# Patient Record
Sex: FEMALE | Race: WHITE | NOT HISPANIC OR LATINO | Employment: FULL TIME | URBAN - METROPOLITAN AREA
[De-identification: names, ages, dates, MRNs, and addresses within clinical notes are randomized per-mention and may not be internally consistent; named-entity substitution may affect disease eponyms.]

---

## 2018-07-13 ENCOUNTER — TRANSCRIBE ORDERS (OUTPATIENT)
Dept: ADMINISTRATIVE | Facility: HOSPITAL | Age: 68
End: 2018-07-13

## 2018-07-13 DIAGNOSIS — Z12.39 SCREENING BREAST EXAMINATION: Primary | ICD-10-CM

## 2018-07-25 ENCOUNTER — HOSPITAL ENCOUNTER (OUTPATIENT)
Dept: RADIOLOGY | Facility: HOSPITAL | Age: 68
Discharge: HOME | End: 2018-07-25
Payer: COMMERCIAL

## 2018-07-25 DIAGNOSIS — Z12.39 SCREENING BREAST EXAMINATION: ICD-10-CM

## 2018-07-25 PROCEDURE — 77067 SCR MAMMO BI INCL CAD: CPT

## 2023-12-29 ENCOUNTER — TELEPHONE (OUTPATIENT)
Dept: NEUROLOGY | Facility: CLINIC | Age: 73
End: 2023-12-29

## 2023-12-29 NOTE — TELEPHONE ENCOUNTER
VM received at 11 am:    I'm calling on behalf of my wife. She has some memory loss problems and I need to possibly have a few questions answered and make an appointment to see a neurologist. I need to see what other medications we can get for dementia and the beginning stages of Alzheimer's. My phone number is  [phone]. Thank you.  _____________________________________________________________________________    Called the phone number to obtain the name and  of the potential patient. Spoke with Simone. He provided this pt's name and . Routed to the clerical team to follow up regarding scheduling an OV.   CB# [phone].

## 2024-04-17 ENCOUNTER — TELEPHONE (OUTPATIENT)
Dept: NEUROLOGY | Facility: CLINIC | Age: 74
End: 2024-04-17

## 2024-04-19 ENCOUNTER — CONSULT (OUTPATIENT)
Dept: NEUROLOGY | Facility: CLINIC | Age: 74
End: 2024-04-19
Payer: MEDICARE

## 2024-04-19 VITALS — SYSTOLIC BLOOD PRESSURE: 116 MMHG | HEART RATE: 82 BPM | DIASTOLIC BLOOD PRESSURE: 80 MMHG

## 2024-04-19 DIAGNOSIS — G30.9 ALZHEIMER'S DEMENTIA WITHOUT BEHAVIORAL DISTURBANCE (HCC): Primary | ICD-10-CM

## 2024-04-19 DIAGNOSIS — F02.80 ALZHEIMER'S DEMENTIA WITHOUT BEHAVIORAL DISTURBANCE (HCC): Primary | ICD-10-CM

## 2024-04-19 PROCEDURE — 99205 OFFICE O/P NEW HI 60 MIN: CPT | Performed by: STUDENT IN AN ORGANIZED HEALTH CARE EDUCATION/TRAINING PROGRAM

## 2024-04-19 RX ORDER — BRIMONIDINE TARTRATE 2 MG/ML
SOLUTION/ DROPS OPHTHALMIC
COMMUNITY
Start: 2024-02-01

## 2024-04-19 RX ORDER — METOPROLOL SUCCINATE 25 MG/1
25 TABLET, EXTENDED RELEASE ORAL DAILY
COMMUNITY
Start: 2024-02-20

## 2024-04-19 RX ORDER — SIMVASTATIN 10 MG
1 TABLET ORAL EVERY EVENING
COMMUNITY
Start: 2023-08-01 | End: 2024-07-31

## 2024-04-19 RX ORDER — DONEPEZIL HYDROCHLORIDE 5 MG/1
TABLET, FILM COATED ORAL
COMMUNITY
Start: 2024-01-25

## 2024-04-19 RX ORDER — BIMATOPROST 0.1 MG/ML
SOLUTION/ DROPS OPHTHALMIC
COMMUNITY
Start: 2024-02-16

## 2024-04-19 RX ORDER — MIRTAZAPINE 7.5 MG/1
7.5 TABLET, FILM COATED ORAL
COMMUNITY
Start: 2024-03-18

## 2024-04-19 NOTE — PROGRESS NOTES
Patient ID: Arely Wood is a 73 y.o. female.    Assessment/Plan:    Diagnoses and all orders for this visit:    Alzheimer's dementia without behavioral disturbance (HCC)    Patient is a 73 year old female presenting to the clinic today in regards to a second opinion for her dementia. Patient has a PMH of palpitations, AD, anxiety, insomnia, and hypercholesteremia.     Given patient's history of being reviewed from [facility] notes along with patient and her  confirmed that, there seems to be a mismatch in regards to patient's progression for dementia in just 3 years versus possibly other underlying etiology.  There has been a gradual decline but patient is still able to maintain all of her ADLs on her own.  Seems to be that patient is much better at home but on testing she appears to be worse.  Patient's  is concerned that this is possibly not dementia.  At this time, since we are unable to see the MRI images, we will repeat an MRI brain with and without contrast with NeuroQuant analysis.  Along with that, we will also order neuropsych testing to identify exactly where patient's memory deficits are arising from.  This will also help us compare against the testing that was done in 2021.      To aid patient's memory, we will place an OT referral for cognitive exercises.    Given that patient and her  were very upset in regards to the license being taken away from the patient, we will order a OT driving assessment for the patient.    MRI Brain wo contrast 1/26/23: No change. No mass. Moderate to marked cerebral white matter disease. That appearance is likely due to chronic small vessel ischemic disease. (Unable to see pictures)      Plan:  Continue Aricept 5 mg since the patient never stopped it  Continue Remeron 7.5 mg for sleep  MRI Brain Neuroquant w wo contrast  Neuropsych testing ordered  OT referral for cognitive therapies and driving assessment  Monitor for worsening symptoms  Call  for any questions or concerns      The patient indicates understanding of these issues and agrees with the plan.    Return in about 6 months (around 10/19/2024).    This patient case was discussed with Dr. Zully Rutledge.      Subjective:    HPI    Patient is a 73 year old female presenting to the clinic today in regards to a second opinion for her dementia. Patient has a PMH of palpitations, AD, anxiety, insomnia, and hypercholesteremia.     Patient has been seeing [facility], Dr. Emilie Briggs, since 2/4/21 for memory changes. At that time, the patient's MMSE score was a 30/30. Given that, the patient was sent for neuropsychological testing. Patient wanted to defer the MRI so it was deferred. Neuropsych testing was done on 5/3/21 and patient was deemed as mild cognitive impairment with her performance variable across different measures. On 3/2/22, the patient had a slight decline on her exam in comparison to the initial exam and as a result, the patient was started on Aricept 5 mg. Concomitantly, she had to stop the Celexa which she was taking. On 11/29/22 at the office visit, the patient was tachycardiac, anxious, memory was worse, couldn't complete sentences, and was having difficulties finding words. So, they decided to stop the Aricept and resume the Celexa. On 1/31/223, the patient called to state that she never actually started the Celexa after stopping the Aricept and asked if she can continue the Aricept once again. When the patient was seen on 3/8/23, the patient was back on Aricept but struggling with sleep despite using melatonin. On 10/12/23, the patient was started on trazodone given the side effects with Mirtazepine. She was also offered an evaluation at [facility] for Lecanumab despite the history of 2 microhemorrhages. On 12/27/23, the patient saw [facility] medicine who determined that she was not a candidate for Lecanumab given the 7/30 on SLUMS and her Aricept was stopped  due the side effects. That day she was switched back to Mirtazapine for mood.    Now she presents to [facility] for a second opinion. They are very upset that at the Geriatric medicine visit, the doctor took away the patient's license. According to the patient and her , the patient is still able to maintain all of her ADLs on her own. She started having difficulties with numbers approximately 6 months and that is when her  took over the finances. Patient's  states that he really does not believe that she has dementia given its just her memory that is affected.     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    Patient denies depression, sleep disturbances,or agitation. Of note, the patient still continues to take her Aricept.    The following portions of the patient's history were reviewed and updated as appropriate: She  has no past medical history on file.     Patient Active Problem List    Diagnosis Date Noted    Alzheimer's dementia without behavioral disturbance (HCC) 12/27/2023     She  has no past surgical history on file.  Her Family history is unknown by patient.  She  reports that she does not drink alcohol and does not use drugs. No history on file for tobacco use.  Current Outpatient Medications   Medication Sig Dispense Refill    brimonidine tartrate 0.2 % ophthalmic solution INSTILL 1 DROP TWICE A DAY IN THE LEFT EYE      donepezil (ARICEPT) 5 mg tablet       Lumigan 0.01 % ophthalmic drops INSTILL 1 DROP INTO LEFT EYE AT BEDTIME      metoprolol succinate (TOPROL-XL) 25 mg 24 hr tablet Take 25 mg by mouth daily      mirtazapine (REMERON) 7.5 MG tablet Take 7.5 mg by mouth      simvastatin (ZOCOR) 10 mg tablet Take 1 tablet by mouth every evening       No current facility-administered medications for this visit.     Current Outpatient Medications on File Prior to  Visit   Medication Sig    brimonidine tartrate 0.2 % ophthalmic solution INSTILL 1 DROP TWICE A DAY IN THE LEFT EYE    donepezil (ARICEPT) 5 mg tablet     Lumigan 0.01 % ophthalmic drops INSTILL 1 DROP INTO LEFT EYE AT BEDTIME    metoprolol succinate (TOPROL-XL) 25 mg 24 hr tablet Take 25 mg by mouth daily    mirtazapine (REMERON) 7.5 MG tablet Take 7.5 mg by mouth    simvastatin (ZOCOR) 10 mg tablet Take 1 tablet by mouth every evening     No current facility-administered medications on file prior to visit.     She has No Known Allergies..         Objective:    Blood pressure 116/80, pulse 82.    Physical Exam  Vitals reviewed.   HENT:      Head: Normocephalic and atraumatic.      Mouth/Throat:      Mouth: Mucous membranes are moist.   Eyes:      General: Lids are normal.      Extraocular Movements: Extraocular movements intact.      Pupils: Pupils are equal, round, and reactive to light.   Cardiovascular:      Rate and Rhythm: Normal rate.   Pulmonary:      Effort: Pulmonary effort is normal.   Musculoskeletal:         General: Normal range of motion.   Neurological:      Mental Status: She is alert.      Deep Tendon Reflexes:      Reflex Scores:       Tricep reflexes are 1+ on the right side and 1+ on the left side.       Bicep reflexes are 1+ on the right side and 1+ on the left side.       Brachioradialis reflexes are 1+ on the right side and 1+ on the left side.       Patellar reflexes are 1+ on the right side and 1+ on the left side.       Achilles reflexes are 1+ on the right side and 1+ on the left side.  Psychiatric:         Mood and Affect: Mood normal.         Speech: Speech normal.         Neurological Exam  Mental Status  Alert. Oriented only to person. Immediate recall: 0/5. At 5 minutes 0/5. Unable to copy figure. Able to contour and put numbers. Not able to accurately draw hands.. Speech is normal. Language is fluent with no aphasia. Unable to perform serial calculations. and 3 backward.  MOCA 7/30.  Gets confused quickly.    Cranial Nerves  CN II: Visual acuity is normal. Visual fields full to confrontation.  CN III, IV, VI: Extraocular movements intact bilaterally. Normal lids and orbits bilaterally. Pupils equal round and reactive to light bilaterally.  CN V: Facial sensation is normal.  CN VII: Full and symmetric facial movement.  CN VIII: Hearing is normal.  CN IX, X: Palate elevates symmetrically. Normal gag reflex.  CN XI: Shoulder shrug strength is normal.  CN XII: Tongue midline without atrophy or fasciculations.    Motor  Normal muscle bulk throughout. No fasciculations present. Normal muscle tone. No abnormal involuntary movements.  Globally 4+/5 strength.    Sensory  Light touch is normal in upper and lower extremities.     Reflexes                                            Right                      Left  Brachioradialis                    1+                         1+  Biceps                                 1+                         1+  Triceps                                1+                         1+  Patellar                                1+                         1+  Achilles                                1+                         1+    Coordination  Right: Finger-to-nose normal.Left: Finger-to-nose normal.    Gait  Casual gait: Slowed gait.        ROS:  Review of Systems   Constitutional:  Negative for appetite change, fatigue and fever.   HENT: Negative.  Negative for hearing loss, tinnitus, trouble swallowing and voice change.    Eyes: Negative.  Negative for photophobia, pain and visual disturbance.   Respiratory: Negative.  Negative for shortness of breath.    Cardiovascular: Negative.  Negative for palpitations.   Gastrointestinal: Negative.  Negative for nausea and vomiting.   Endocrine: Negative.  Negative for cold intolerance.   Genitourinary: Negative.  Negative for dysuria, frequency and urgency.   Musculoskeletal:  Negative for back pain, gait problem, myalgias, neck pain  and neck stiffness.   Skin: Negative.  Negative for rash.   Allergic/Immunologic: Negative.    Neurological: Negative.  Negative for dizziness, tremors, seizures, syncope, facial asymmetry, speech difficulty, weakness, light-headedness, numbness and headaches.   Hematological: Negative.  Does not bruise/bleed easily.   Psychiatric/Behavioral:  Positive for confusion. Negative for hallucinations and sleep disturbance.         Patient  states she is having a hard time getting a full sentence out. Patient states she finds herself confused things don't click with her. Looking at a clock is confusing. Forgetting names and places have been difficult. The last two years there has been a decline.

## 2024-04-19 NOTE — PROGRESS NOTES
Patient ID: Arely Wood is a 73 y.o. female.    Assessment/Plan:    No problem-specific Assessment & Plan notes found for this encounter.       {Assess/PlanSmartLinks:95457}       Subjective:    HPI    {[facility] HPI texts:15991}    {Common ambulatory SmartLinks:96323}         Objective:    There were no vitals taken for this visit.    Physical Exam    Neurological Exam      ROS:    Review of Systems   Constitutional:  Negative for appetite change, fatigue and fever.   HENT: Negative.  Negative for hearing loss, tinnitus, trouble swallowing and voice change.    Eyes: Negative.  Negative for photophobia, pain and visual disturbance.   Respiratory: Negative.  Negative for shortness of breath.    Cardiovascular: Negative.  Negative for palpitations.   Gastrointestinal: Negative.  Negative for nausea and vomiting.   Endocrine: Negative.  Negative for cold intolerance.   Genitourinary: Negative.  Negative for dysuria, frequency and urgency.   Musculoskeletal:  Negative for back pain, gait problem, myalgias, neck pain and neck stiffness.   Skin: Negative.  Negative for rash.   Allergic/Immunologic: Negative.    Neurological: Negative.  Negative for dizziness, tremors, seizures, syncope, facial asymmetry, speech difficulty, weakness, light-headedness, numbness and headaches.   Hematological: Negative.  Does not bruise/bleed easily.   Psychiatric/Behavioral:  Positive for confusion. Negative for hallucinations and sleep disturbance.         Patient  states she is having a hard time  getting a full sentence out. Patient states she finds herself confused things don't click with her. Looking at a clock is confusing. Forgetting names and places have been difficult. The last two years there has been a decline.

## 2024-05-09 ENCOUNTER — EVALUATION (OUTPATIENT)
Facility: CLINIC | Age: 74
End: 2024-05-09
Payer: MEDICARE

## 2024-05-09 DIAGNOSIS — F02.80 ALZHEIMER'S DEMENTIA WITHOUT BEHAVIORAL DISTURBANCE (HCC): Primary | ICD-10-CM

## 2024-05-09 DIAGNOSIS — G30.9 ALZHEIMER'S DEMENTIA WITHOUT BEHAVIORAL DISTURBANCE (HCC): Primary | ICD-10-CM

## 2024-05-09 PROCEDURE — 97167 OT EVAL HIGH COMPLEX 60 MIN: CPT

## 2024-05-09 NOTE — PROGRESS NOTES
OCCUPATIONAL THERAPY INITIAL EVALUATION        24  Arely Wood  1950  [id number]  Divine Polk MD   Diagnosis ICD-10-CM Associated Orders   1. Alzheimer's dementia without behavioral disturbance (HCC)  G30.9 Ambulatory Referral to Occupational Therapy    F02.80             Assessment/Plan      SKILLED ANALYSIS:    Pt is a 73 y.o. female referred to Occupational Therapy s/p Alzheimer's dementia without behavioral disturbance (HCC) [G30.9, F02.80].  Pt participated in skilled OT evaluation and, following formalized testing as well as clinical observation, pt presents with the following areas of deficit: STM, LTM/delayed recall, processing speed, direction following, multitasking/dual tasking, attention, divided attention/alternating attention, and mental manipulation impacting ability to independently and safely complete ADL/IADL and leisure tasks.  Pt does demo the need for skilled Occupational Therapy services 2x/week for 12 weeks with focus on ADL re-training, IADL re-training, fxnl cognition, short term memory, long term memory, fxnl attention, family training/education, and leisure pursuits to address the goals as listed below. Pt in agreement with POC, POC to  24          Short Term Goals (to be achieved within 4 weeks):  Pt will maintain attention to task for 45 minutes in multimodal environment for improved memory/ immediate recall for increased engagement in IADLs and leisure activities    Pt will improve auditory and visual processing to follow 1 step directions with 60% accuracy for increased engagement in IADLs and leisure activities   Pt will demo G recall of 50% of Written and verbal information utilizing memory strategy of choice for improved STM/delayed memory and improved ability to engage in ADLs/IADLs/ work and leisure activities.  Pt will identify 3-5 leisure activities that she enjoys, which incorporates cognitive and physical aspects to increase overall health and  "independence with ADLs/IADLs and leisure activities     Long Term Goals (to be achieved within 12 weeks):    Pt will improve auditory and visual processing speed to follow 2 step directions with processing time of <1min and 80% accuracy for improved IADL performance and engagement in leisure activities.   Pt will demo G carryover of use of internal/external memory strategy aides for improved recall of daily events, improved executive functioning with 70% accuracy   Pt will consistently engage in weekly leisure activities to allow for optimal cognitive functioning.   Pt will be S with HEP, with assistance from                  SUBJECTIVE      SUBJECTIVE: \"Sometimes I forget things\"    PATIENT GOAL: to improve memory and attention         HISTORY OF PRESENT ILLNESS:     Pt is a 73 y.o. female who was referred to Occupational Therapy s/p  Alzheimer's dementia without behavioral disturbance (HCC) [G30.9, F02.80]. Pt under the care of neurology since 2021 for declining memory, recently sought a second opinion after receiving Alzheimer's dementia diagnosis. Pt referred to OT to maximize cognitive functioning. Pt has also been referred for a  evaluation.           PMH: palpitations, AD, anxiety, insomnia, and hypercholesteremia.     Past Surgical Hx: History reviewed. No pertinent surgical history.     HOME SETUP/ CLOF: lives with , 2 SH, 2 DIANE, power room on 1st floor; bed/ full bath on 2nd floor; laundry machines on 2nd floor     ADLs: I with all ADLs    IADLs: pt reports doing laundry and cooking; pt's  manages finances; pt manages own medications; uses a pill box   (-) ; stopped driving about 2 months ago; doctor reported to [place], with pt reporting she did surrender her license but is upset and wants to be able to return to driving. A  evaluation was ordered for further assessment of driving abilities and safety.     Functional Mobility: I with functional mobility; reports " "no difficulties with balance; (-) dizziness     Work: retired RN    Physical activity/ leisure engagement: walks with  2-3x/ week, gardening, \"I don't really know\"      Pain Levels: none            OBJECTIVE         PHYSICAL ASSESSMENT    R handed     UE ROM LUE AROM  RUE AROM COMMENTS   Shoulder Flex WNL WNL    Shoulder Ext WNL  WNL    Shoulder ABD WNL  WNL    Horizontal ABD WNL  WNL    Horizontal ADD WNL  WNL    Shoulder IR  WNL  WNL    Shoulder ER WNL WNL    Elbow Flex WNL WNL    Elbow Ext  WNL WNL    Supination  WNL WNL    Pronation  WNL WNL    Wrist Flex WNL WNL    Wrist Ext WNL WNL    Finger Flex WNL WNL    Finger Ext WNL WNL    Opposition  WNL WNL                               MMT  LUE RUE   Comments   Shoulder Flex/Ext 5/5 5/5    Shoulder Abd 5/5 5/5    Shoulder ER 5/5 5/5    Shoulder IR 5/5 5/5    Elbow Flex 5/5 5/5    Elbow Ext 5/5 5/5    Wrist Flex 5/5 5/5    Wrist Ext 5/5 5/5    Gross Grasp 5/5 5/5            SENSATION LUE RUE Comments   Sharp Dull  Intact Intact    Proprioception Intact Intact    Pain Intact Intact    Hot/Cold Temp Intact Intact              COGNITIVE ASSESSMENT    Formal Assessments:  Trails A: 3 min 40 sec, errors after number 15  Trails B: NT  Mesulum symbol scannin min 57 sec, missed 2 symbols     Clinical Observation:  Memory: Impaired working memory; impaired STM; impaired LTM  Attention: impaired sustained attn; distracted by internal thoughts   Processing: delayed processing; difficulty answering simple questions; impaired comprehension of 1 step directions  Executive functioning: impaired mental manipulation   Communication: difficulty with expression and completing sentences     Cognitive Checklist: Patient indicated that she is experiencing the following symptoms:    Memory: Remembering what people have told you, Remembering peoples' names, Remembering the date/day of the week, and Keeping track of your appointments    Attention: Easily distracted, Dividing your " attention (i.e., multi-tasking), and Losing your train of thought    Processing: Processing new information , Following directions, and Responding to questions in a timely manner     Executive Functions: Organizing your thoughts and Planning daily tasks    Communication: Word finding in conversation, Expressing thoughts and ideas fluently, and Expressing thoughts and ideas into writing    Visual: Losing spot on the page when reading            VISUAL ASSESSMENT        Vision Screen       ROM WFL    Tracking WFL    Saccades WFL    Convergence/ Divergence WFL              PLANNED THERAPY INTERVENTIONS:  Therapeutic activity  Therapeutic exercise  Neuromuscular re-education   Cognitive re-training   ADL re-training   IADL re-training  Internal and external memory aides  Sustained/alternating/divided attention  Memory and mental manipulation  Auditory processing with immediate recall  Memory retention with immediate and delayed recall         INTERVENTION COMMENTS:  Diagnosis: Alzheimer's dementia without behavioral disturbance (HCC) [G30.9, F02.80]  Precautions: impaired memory   Insurance: Payor: MEDICARE / Plan: MEDICARE A AND B / Product Type: Medicare A & B Fee for Service /   Visit: 1   PN due 6/13/24

## 2024-05-11 ENCOUNTER — HOSPITAL ENCOUNTER (OUTPATIENT)
Dept: MRI IMAGING | Facility: HOSPITAL | Age: 74
Discharge: HOME | End: 2024-05-11
Payer: MEDICARE

## 2024-05-11 DIAGNOSIS — G30.9 ALZHEIMER'S DEMENTIA WITHOUT BEHAVIORAL DISTURBANCE (HCC): ICD-10-CM

## 2024-05-11 DIAGNOSIS — F02.80 ALZHEIMER'S DEMENTIA WITHOUT BEHAVIORAL DISTURBANCE (HCC): ICD-10-CM

## 2024-05-11 PROCEDURE — A9585 GADOBUTROL INJECTION: HCPCS

## 2024-05-11 PROCEDURE — 70553 MRI BRAIN STEM W/O & W/DYE: CPT

## 2024-05-11 RX ORDER — GADOBUTROL 604.72 MG/ML
7.5 INJECTION INTRAVENOUS
Status: COMPLETED | OUTPATIENT
Start: 2024-05-11 | End: 2024-05-11

## 2024-05-11 RX ADMIN — GADOBUTROL 7.5 ML: 604.72 INJECTION INTRAVENOUS at 08:52

## 2024-05-16 ENCOUNTER — OFFICE VISIT (OUTPATIENT)
Facility: CLINIC | Age: 74
End: 2024-05-16
Payer: MEDICARE

## 2024-05-16 DIAGNOSIS — F02.80 ALZHEIMER'S DEMENTIA WITHOUT BEHAVIORAL DISTURBANCE (HCC): Primary | ICD-10-CM

## 2024-05-16 DIAGNOSIS — G30.9 ALZHEIMER'S DEMENTIA WITHOUT BEHAVIORAL DISTURBANCE (HCC): Primary | ICD-10-CM

## 2024-05-16 PROCEDURE — 97530 THERAPEUTIC ACTIVITIES: CPT

## 2024-05-16 NOTE — PROGRESS NOTES
"Occupational Therapy Daily Note:    Today's date: 2024  Patient name: Arely Wood  : 1950  MRN: [id number]  Referring provider: Divine Polk MD  Dx:   Encounter Diagnosis   Name Primary?    Alzheimer's dementia without behavioral disturbance (HCC) Yes       Subjective: \"it just goes out\" pt referring to difficulty with her memory    Objective: Pt engaged in skilled OT treatment session with focus on fxnl cognition, short term memory, long term memory, and fxnl attention to increase engagement, endurance, tolerance, and independence with daily ADL and IADL tasks.     CPT Code Minutes                                           Task Details        Therapeutic Activity  Functional cognition:     SLUMS: , difficulty with delayed recall, processing, orientation, mental manipulation, working memory            Began education on compensatory memory strategies, including chunking, repetition, speaking out loud, and writing information down.     Cognitive task focused on use of memory strategies. Pt provided with list of 15 words. Pt unable to formulate categories based on presented words. Once provided 3 categories, pt required mod cues to place the 15 words into correct categories. Pt with mod/max difficulty following 1 step directions; required increased time for processing.             Neuro Re-Ed               Therapeutic Exercise               Manual          HEP  : Cog HEP: Provided 4 categories. Pt to list 5 words in each category and then practice memory strategies to attempt recall          Assessment: Tolerated treatment well. Began education on compensatory memory. Pt with fair understanding, will cont to practice. Patient would benefit from continued skilled OT.    Plan: Continued skilled OT per POC    INTERVENTION COMMENTS:  Diagnosis: Alzheimer's dementia without behavioral disturbance (HCC) [G30.9, F02.80]  Precautions: impaired memory   Insurance: Payor: MEDICARE / Plan: MEDICARE A " AND B / Product Type: Medicare A & B Fee for Service /   Visit: 2  PN due 6/13/24

## 2024-05-22 ENCOUNTER — OFFICE VISIT (OUTPATIENT)
Facility: CLINIC | Age: 74
End: 2024-05-22
Payer: MEDICARE

## 2024-05-22 DIAGNOSIS — F02.80 ALZHEIMER'S DEMENTIA WITHOUT BEHAVIORAL DISTURBANCE (HCC): Primary | ICD-10-CM

## 2024-05-22 DIAGNOSIS — G30.9 ALZHEIMER'S DEMENTIA WITHOUT BEHAVIORAL DISTURBANCE (HCC): Primary | ICD-10-CM

## 2024-05-22 PROCEDURE — 97530 THERAPEUTIC ACTIVITIES: CPT

## 2024-05-22 NOTE — PROGRESS NOTES
"Occupational Therapy Daily Note:    Today's date: 2024  Patient name: Arely Wood  : 1950  MRN: [id number]  Referring provider: Divine Polk MD  Dx:   Encounter Diagnosis   Name Primary?    Alzheimer's dementia without behavioral disturbance (HCC) Yes       Subjective: \"I haven't done this in a long time\"    Objective: Pt engaged in skilled OT treatment session with focus on fxnl cognition, short term memory, long term memory, and fxnl attention to increase engagement, endurance, tolerance, and independence with daily ADL and IADL tasks.     CPT Code Minutes                                           Task Details        Therapeutic Activity  Cont with education on compensatory memory strategies, including chunking, repetition, speaking out loud, and writing information down.        Cognitive task of visual memory using a detailed picture.  -when asked to name all items in the picture, pt with max difficulty with naming/ word finding. Pt required cues and choice of 2 to correctly name common items; increased time. Repeated naming of all items 3x, with pt writing down names on separate paper on last trial.   -From list of written words, pt able to identify objects in the picture with min cueing.   -Categorization: pt required max cues to identify items in the picture to fit into the given category.   -pt required entire session to complete task.   -Homework: pt given 3 more category headings to sort items from the picture into the categories.                  Neuro Re-Ed               Therapeutic Exercise               Manual          HEP  : Cog HEP: Provided 4 categories. Pt to list 5 words in each category and then practice memory strategies to attempt recall          Assessment: Tolerated treatment well. Cont education on compensatory strategies. Pt with max difficulty using strategies, increased time to complete task. Patient would benefit from continued skilled OT.    Plan: Continued skilled " OT per POC    INTERVENTION COMMENTS:  Diagnosis: Alzheimer's dementia without behavioral disturbance (HCC) [G30.9, F02.80]  Precautions: impaired memory   Insurance: Payor: MEDICARE / Plan: MEDICARE A AND B / Product Type: Medicare A & B Fee for Service /   Visit: 3  PN due 6/13/24

## 2024-05-30 ENCOUNTER — OFFICE VISIT (OUTPATIENT)
Facility: CLINIC | Age: 74
End: 2024-05-30
Payer: MEDICARE

## 2024-05-30 DIAGNOSIS — F02.80 ALZHEIMER'S DEMENTIA WITHOUT BEHAVIORAL DISTURBANCE (HCC): Primary | ICD-10-CM

## 2024-05-30 DIAGNOSIS — G30.9 ALZHEIMER'S DEMENTIA WITHOUT BEHAVIORAL DISTURBANCE (HCC): Primary | ICD-10-CM

## 2024-05-30 PROCEDURE — 97530 THERAPEUTIC ACTIVITIES: CPT

## 2024-05-30 NOTE — PROGRESS NOTES
Occupational Therapy Daily Note:    Today's date: 2024  Patient name: Arely Wood  : 1950  MRN: [id number]  Referring provider: Divine Polk MD  Dx:   Encounter Diagnosis   Name Primary?    Alzheimer's dementia without behavioral disturbance (HCC) Yes       Subjective: no new complaints       Objective: Pt engaged in skilled OT treatment session with focus on fxnl cognition, short term memory, long term memory, and fxnl attention to increase engagement, endurance, tolerance, and independence with daily ADL and IADL tasks.     CPT Code Minutes                                           Task Details        Therapeutic Activity  Continued practice of compensatory memory strategies, including chunking, repetition, speaking out loud, and writing information down.      -Reviewed pt's homework from last session. Pt was provided with a picture, with memory strategy of chunking/ categorization, with 4 categories provided. Pt was able to categorize items from the picture into correct categories.       STM/ application of strategies:  -Pt able to recall <10% of the items in the picture  -provided pt with the 4 categories given on homework: able to recall 50% of the items, with mod verbal cues and increased time; pt with max difficulty with word finding and mod/max difficulty with item description.       Categorization as a memory strategy:  -given worksheet with picture of 12 items. Pt with max difficulty naming items, able to name 11/12 items with increased time and min cues.  -Homework:  pt given 4 category headings to sort the 12 objects from the worksheet, with instructions to add 4 items to each category.                      Neuro Re-Ed               Therapeutic Exercise               Manual          HEP  : Cog HEP: Provided 4 categories. Pt to list 5 words in each category and then practice memory strategies to attempt recall          Assessment: Tolerated treatment well. Continued education and  practice of memory compensatory strategies. Pt requires max cueing to utilize strategies.  Patient would benefit from continued skilled OT.    Plan: Continued skilled OT per POC    INTERVENTION COMMENTS:  Diagnosis: Alzheimer's dementia without behavioral disturbance (HCC) [G30.9, F02.80]  Precautions: impaired memory   Insurance: Payor: MEDICARE / Plan: MEDICARE A AND B / Product Type: Medicare A & B Fee for Service /   Visit: 4  PN due 6/13/24

## 2024-06-06 ENCOUNTER — OFFICE VISIT (OUTPATIENT)
Facility: CLINIC | Age: 74
End: 2024-06-06
Payer: MEDICARE

## 2024-06-06 DIAGNOSIS — G30.9 ALZHEIMER'S DEMENTIA WITHOUT BEHAVIORAL DISTURBANCE (HCC): Primary | ICD-10-CM

## 2024-06-06 DIAGNOSIS — F02.80 ALZHEIMER'S DEMENTIA WITHOUT BEHAVIORAL DISTURBANCE (HCC): Primary | ICD-10-CM

## 2024-06-06 PROCEDURE — 97530 THERAPEUTIC ACTIVITIES: CPT

## 2024-06-06 NOTE — PROGRESS NOTES
Occupational Therapy Daily Note:    Today's date: 2024  Patient name: Arely Wood  : 1950  MRN: [id number]  Referring provider: Divine Polk MD  Dx:   Encounter Diagnosis   Name Primary?    Alzheimer's dementia without behavioral disturbance (HCC) Yes       Subjective: no new complaints       Objective: Pt engaged in skilled OT treatment session with focus on fxnl cognition, short term memory, long term memory, and fxnl attention to increase engagement, endurance, tolerance, and independence with daily ADL and IADL tasks.     CPT Code Minutes                                           Task Details        Therapeutic Activity  Continued practice of compensatory memory strategies, including chunking, repetition, speaking out loud, and writing information down.      -Reviewed pt's homework from last session. pt given 3 category headings to sort 12 objects from a worksheet, with instructions to add 4 items to each category.  Pt able to add 8 items to each category.       STM/ application of strategies:  -had pt read out loud 2x all the items in each of the 3 categories from her homework.   -provided pt with the 3 headings of the categories. Pt able to recall 5 words in each category with increased time and min/mod cues.      Cognitive task of visual memory using a detailed picture:   -pt able to identify all but 2 items in the picture with increased time and min cueing. Pt with max difficulty with identifying remaining 2 items; was able to identify one of the two with max cues and increased time; unable to recall name of remaining item      Homework:    -provided pt with 4 category headings; instructed to place items in picture into correct categories.  -Cognitive worksheet to [place] all words applicable to a specified category.                    Neuro Re-Ed               Therapeutic Exercise               Manual          HEP  : Cog HEP: Provided 4 categories. Pt to list 5 words in each category and  then practice memory strategies to attempt recall          Assessment: Tolerated treatment well. Continued education and practice of memory compensatory strategies. Pt requires max cueing to utilize strategies. Pt with improved ability to utilize strategies for recall. Patient would benefit from continued skilled OT.    Plan: Continued skilled OT per POC    INTERVENTION COMMENTS:  Diagnosis: Alzheimer's dementia without behavioral disturbance (HCC) [G30.9, F02.80]  Precautions: impaired memory   Insurance: Payor: MEDICARE / Plan: MEDICARE A AND B / Product Type: Medicare A & B Fee for Service /   Visit: 5  PN due 6/13/24

## 2024-06-14 ENCOUNTER — OFFICE VISIT (OUTPATIENT)
Facility: CLINIC | Age: 74
End: 2024-06-14
Payer: MEDICARE

## 2024-06-14 DIAGNOSIS — F02.80 ALZHEIMER'S DEMENTIA WITHOUT BEHAVIORAL DISTURBANCE (HCC): Primary | ICD-10-CM

## 2024-06-14 DIAGNOSIS — G30.9 ALZHEIMER'S DEMENTIA WITHOUT BEHAVIORAL DISTURBANCE (HCC): Primary | ICD-10-CM

## 2024-06-14 PROCEDURE — 97530 THERAPEUTIC ACTIVITIES: CPT

## 2024-06-14 NOTE — PROGRESS NOTES
Occupational Therapy Daily Note:    Today's date: 2024  Patient name: Arely Wood  : 1950  MRN: [id number]  Referring provider: Claudio Briggs MD  Dx:   Encounter Diagnosis   Name Primary?    Alzheimer's dementia without behavioral disturbance (HCC) Yes       Subjective: no new complaints       Objective: Pt engaged in skilled OT treatment session with focus on fxnl cognition, short term memory, long term memory, and fxnl attention to increase engagement, endurance, tolerance, and independence with daily ADL and IADL tasks.     CPT Code Minutes                                           Task Details        Therapeutic Activity  Reviewed pt's homework from last session:   -Pt was provided with 4 category headings and instructed to place items in picture into correct categories: pt was able to correctly categorize items into the 4 categories with only 2 errors.  -Cognitive worksheet to [place] all words applicable to a specified category: completed with 100% accuracy          Cognitive retraining of following directions worksheets:   -pt with max difficulty with comprehension of simple written directions in full sentences; unable to complete, even with max cues.   -pt able to follow written simple 1 step commands with min cues and increased time. Pt requires increased time for processing and task execution.       Homework: engage in 1 leisure activity and write down on paper to bring to next therapy session.          Neuro Re-Ed               Therapeutic Exercise               Manual          HEP  : Cog HEP: Provided 4 categories. Pt to list 5 words in each category and then practice memory strategies to attempt recall          Assessment: Tolerated treatment well. Pt with max difficulty with direction following; requires simple one step directions; increased time for processing and task execution.  Patient would benefit from continued skilled OT.    Plan: Continued skilled OT per  POC    INTERVENTION COMMENTS:  Diagnosis: Alzheimer's dementia without behavioral disturbance (HCC) [G30.9, F02.80]  Precautions: impaired memory   Insurance: Payor: MEDICARE / Plan: MEDICARE A AND B / Product Type: Medicare A & B Fee for Service /   Visit: 6  PN due 6/13/24

## 2024-06-18 ENCOUNTER — OFFICE VISIT (OUTPATIENT)
Facility: CLINIC | Age: 74
End: 2024-06-18
Payer: MEDICARE

## 2024-06-18 DIAGNOSIS — G30.9 ALZHEIMER'S DEMENTIA WITHOUT BEHAVIORAL DISTURBANCE (HCC): Primary | ICD-10-CM

## 2024-06-18 DIAGNOSIS — F02.80 ALZHEIMER'S DEMENTIA WITHOUT BEHAVIORAL DISTURBANCE (HCC): Primary | ICD-10-CM

## 2024-06-18 PROCEDURE — 97530 THERAPEUTIC ACTIVITIES: CPT

## 2024-06-18 NOTE — PROGRESS NOTES
Occupational Therapy Daily Note:    Today's date: 2024  Patient name: Arely Wood  : 1950  MRN: [id number]  Referring provider: Divine Polk MD  Dx:   Encounter Diagnosis   Name Primary?    Alzheimer's dementia without behavioral disturbance (HCC) Yes         Subjective: no new complaints       Objective: Pt engaged in skilled OT treatment session with focus on fxnl cognition, short term memory, long term memory, and fxnl attention to increase engagement, endurance, tolerance, and independence with daily ADL and IADL tasks.     CPT Code Minutes                                           Task Details        Therapeutic Activity  Reviewed pt's homework from last session:  - pt instructed to engage in 1 leisure activity and write down on paper to bring to today's session  - pt wrote leisure activities she engaged in including going to the [employer], [employer], and celebrating father's day        Continued practice of compensatory memory strategies, including repetition, speaking out loud, and writing information down.     Visual Word-Finding/Recall Task:    First trial verbal identification of items; pt instructed to identify all items in picture   - pt able to identify 3 items without cues; max difficulty naming remaining items   - pt then provided with a list of remaining items found in the picture; pt able to identify 17/18 items with min/mod verbal and visual cues    Second trial verbal and written identification of items in the picture  - pt given visual cues for task initiation  - pt able to identify 7 items independently with increased time   - pt able to identify 8 items with max increased time and mod/max verbal and visual cues    HW:  - pt given the visual picture from today's session; instructed to write down the names of the items in the picture                 Neuro Re-Ed               Therapeutic Exercise               Manual          HEP  5/16: Cog HEP: Provided 4 categories. Pt to list 5  words in each category and then practice memory strategies to attempt recall          Assessment: Tolerated treatment well. Pt with mod/max difficulty with word-finding. Pt required mod/max cues and increased time for task completion. Patient would benefit from continued skilled OT.    Plan: Continued skilled OT per POC    INTERVENTION COMMENTS:  Diagnosis: Alzheimer's dementia without behavioral disturbance (HCC) [G30.9, F02.80]  Precautions: impaired memory   Insurance: Payor: MEDICARE / Plan: MEDICARE A AND B / Product Type: Medicare A & B Fee for Service /   Visit: 7  PN due 6/13/24

## 2024-06-20 ENCOUNTER — OFFICE VISIT (OUTPATIENT)
Facility: CLINIC | Age: 74
End: 2024-06-20
Payer: MEDICARE

## 2024-06-20 DIAGNOSIS — F02.80 ALZHEIMER'S DEMENTIA WITHOUT BEHAVIORAL DISTURBANCE (HCC): Primary | ICD-10-CM

## 2024-06-20 DIAGNOSIS — G30.9 ALZHEIMER'S DEMENTIA WITHOUT BEHAVIORAL DISTURBANCE (HCC): Primary | ICD-10-CM

## 2024-06-20 PROCEDURE — 97530 THERAPEUTIC ACTIVITIES: CPT

## 2024-06-20 NOTE — PROGRESS NOTES
Occupational Therapy Daily Note:    Today's date: 2024  Patient name: Arely Wood  : 1950  MRN: [id number]  Referring provider: Divine Polk MD  Dx:   Encounter Diagnosis   Name Primary?    Alzheimer's dementia without behavioral disturbance (HCC) Yes       Subjective: no new complaints       Objective: Pt engaged in skilled OT treatment session with focus on fxnl cognition, short term memory, long term memory, and fxnl attention to increase engagement, endurance, tolerance, and independence with daily ADL and IADL tasks.     CPT Code Minutes                                           Task Details        Therapeutic Activity  Reviewed pt's homework which consisted of identifying items in the picture:   - pt given the visual picture from last session; instructed to write down the names of the items in the picture  - pt was able to identify >50% items in the picture         Continued practice of compensatory memory strategies, including repetition, speaking out loud, and writing information down.     Recall task:  - pt instructed to recall items from visual picture given for homework  - of the 22 items, pt able to recall 7 items without cues; max difficulty recalling remaining items    Memory book:  - Provided a binder that contained a monthly and weekly calendar for the pt to keep track of important events, appointments, leisure activities, and other activities the pt participates in.   - Binder will be reviewed in future sessions   - educated pt and  on the importance of engagement in leisure activities for cognitive exercise; demo F/G understanding but will continue to discuss  - Compiled a list of past and current leisure activities for pt to reference; pt had difficulty remembering activities she previously engaged in  - pt's  present during session; pt's  identified that the pt has decreased engagement in activities  - encouraged pt to engage in at least 1 activity every  other day       HW:  - Recommended pt participate in 2 leisure activities before next appointment  - instructed pt to document leisure activities into memory book                        Neuro Re-Ed               Therapeutic Exercise               Manual          HEP  5/16: Cog HEP: Provided 4 categories. Pt to list 5 words in each category and then practice memory strategies to attempt recall     6/20: Initiated memory book          Assessment: Tolerated treatment well. Pt with max difficulty with recall task. Pt demo F/G understanding of education on engagement of leisure activities. Established memory book as a compensatory strategy. Patient would benefit from continued skilled OT.      Plan: Continued skilled OT per POC    INTERVENTION COMMENTS:  Diagnosis: Alzheimer's dementia without behavioral disturbance (HCC) [G30.9, F02.80]  Precautions: impaired memory   Insurance: Payor: MEDICARE / Plan: MEDICARE A AND B / Product Type: Medicare A & B Fee for Service /   Visit: 8  PN due 7/12/24

## 2024-06-25 ENCOUNTER — OFFICE VISIT (OUTPATIENT)
Facility: CLINIC | Age: 74
End: 2024-06-25
Payer: MEDICARE

## 2024-06-25 DIAGNOSIS — F02.80 ALZHEIMER'S DEMENTIA WITHOUT BEHAVIORAL DISTURBANCE (HCC): Primary | ICD-10-CM

## 2024-06-25 DIAGNOSIS — G30.9 ALZHEIMER'S DEMENTIA WITHOUT BEHAVIORAL DISTURBANCE (HCC): Primary | ICD-10-CM

## 2024-06-25 PROCEDURE — 97530 THERAPEUTIC ACTIVITIES: CPT

## 2024-06-25 NOTE — PROGRESS NOTES
Occupational Therapy Daily Note:    Today's date: 2024  Patient name: Arely Wood  : 1950  MRN: [id number]  Referring provider: Brad Marshall DO  Dx:   Encounter Diagnosis   Name Primary?    Alzheimer's dementia without behavioral disturbance (HCC) Yes         Subjective: no new complaints       Objective: Pt engaged in skilled OT treatment session with focus on fxnl cognition, short term memory, long term memory, and fxnl attention to increase engagement, endurance, tolerance, and independence with daily ADL and IADL tasks.     CPT Code Minutes                                           Task Details        Therapeutic Activity  Reviewed pt's HW:  - Recommended pt participate in 2 leisure activities; pt completed 2 sodoku puzzles since last session  - Pt documented daily and leisure activities she participated in into memory book       Memory book:  - Pt discussed what activities she engaged in since last session  - Pt reported needing increased time to complete activities  - continued educating pt and  on importance of engagement in leisure activities for cognitive exercise; demo G-/G understanding of education  - Using interest checklist, collaborated with pt and  to add to the compiled list of leisure activities; pt required mod/max cues to remember past leisure activities  - Pt provided with monthly calendar; identified activities for pt to engage in every day  - Developed a daily log for pt to update what activities she participated in        HW:  - encouraged pt to engage in 1 leisure activity a day and document the activity in memory book  - will review with pt and her  during the next session                              Neuro Re-Ed               Therapeutic Exercise               Manual          HEP  : Cog HEP: Provided 4 categories. Pt to list 5 words in each category and then practice memory strategies to attempt recall     : Initiated memory book           Assessment: Tolerated treatment well. Pt and  demo G-/G understanding of education on engagement of leisure activities. Continued compiling list of leisure activities to the memory book. Patient would benefit from continued skilled OT.      Plan: Continued skilled OT per POC    INTERVENTION COMMENTS:  Diagnosis: Alzheimer's dementia without behavioral disturbance (HCC) [G30.9, F02.80]  Precautions: impaired memory   Insurance: Payor: MEDICARE / Plan: MEDICARE A AND B / Product Type: Medicare A & B Fee for Service /   Visit: 9  PN due 7/12/24

## 2024-06-27 ENCOUNTER — OFFICE VISIT (OUTPATIENT)
Facility: CLINIC | Age: 74
End: 2024-06-27
Payer: MEDICARE

## 2024-06-27 DIAGNOSIS — F02.80 ALZHEIMER'S DEMENTIA WITHOUT BEHAVIORAL DISTURBANCE (HCC): Primary | ICD-10-CM

## 2024-06-27 DIAGNOSIS — G30.9 ALZHEIMER'S DEMENTIA WITHOUT BEHAVIORAL DISTURBANCE (HCC): Primary | ICD-10-CM

## 2024-06-27 PROCEDURE — 97530 THERAPEUTIC ACTIVITIES: CPT

## 2024-06-27 NOTE — PROGRESS NOTES
Occupational Therapy Daily Note:    Today's date: 2024  Patient name: Arely Wood  : 1950  MRN: [id number]  Referring provider: Divine Polk MD  Dx:   Encounter Diagnosis   Name Primary?    Alzheimer's dementia without behavioral disturbance (HCC) Yes           Subjective: no new complaints       Objective: Pt engaged in skilled OT treatment session with focus on fxnl cognition, short term memory, long term memory, and fxnl attention to increase engagement, endurance, tolerance, and independence with daily ADL and IADL tasks.     CPT Code Minutes                                           Task Details        Therapeutic Activity  Reviewed pt's HW:  - encouraged pt to engage in 1 leisure activity a day and document the activity in memory book  - pt completed a word search, cooked lasagna, played LEILA with her  and granddaughter, and attempted to play Rack-O      Memory book:  - Discussed with pt the activities she engaged in since last session  - Pt reported needing increased time to complete puzzle activities  - Pt reported enjoying playing LEILA with her granddaughter and winning 2 of the games  - Pt reported enjoying cooking for her and her ; reported not having any difficulty cooking  - Continued educating pt and  on importance of engagement in leisure activities for cognitive exercise; demo G understanding of education  - Discussed with pt leisure activities she can engage in before next session      Divided attention task:  - pt completed divided attention worksheet; task focused on alternating names of words from two categories while maintaining alphabetical order  - pt required max verbal cues to recall and maintain alphabetical order   - pt had mod difficulty recalling names of words        HW:   -  encouraged pt to engage in at least 1 leisure activity a day and document the activity in the daily log of her memory book  - will review with pt and her  during the next  session               Neuro Re-Ed               Therapeutic Exercise               Manual          HEP  5/16: Cog HEP: Provided 4 categories. Pt to list 5 words in each category and then practice memory strategies to attempt recall     6/20: Initiated memory book          Assessment: Tolerated treatment well. Pt and  malaika SZYMANSKI understanding of education on engagement of leisure activities. Pt with mod difficulty recalling names and required max verbal cues for maintaining alphabetical order during divided attention task. Patient would benefit from continued skilled OT.      Plan: Continued skilled OT per POC    INTERVENTION COMMENTS:  Diagnosis: Alzheimer's dementia without behavioral disturbance (HCC) [G30.9, F02.80]  Precautions: impaired memory   Insurance: Payor: MEDICARE / Plan: MEDICARE A AND B / Product Type: Medicare A & B Fee for Service /   Visit: 10  PN due 7/12/24

## 2024-07-03 ENCOUNTER — OFFICE VISIT (OUTPATIENT)
Facility: CLINIC | Age: 74
End: 2024-07-03
Payer: MEDICARE

## 2024-07-03 DIAGNOSIS — G30.9 ALZHEIMER'S DEMENTIA WITHOUT BEHAVIORAL DISTURBANCE (HCC): Primary | ICD-10-CM

## 2024-07-03 DIAGNOSIS — F02.80 ALZHEIMER'S DEMENTIA WITHOUT BEHAVIORAL DISTURBANCE (HCC): Primary | ICD-10-CM

## 2024-07-03 PROCEDURE — 97530 THERAPEUTIC ACTIVITIES: CPT

## 2024-07-03 NOTE — PROGRESS NOTES
Occupational Therapy Daily Note:    Today's date: 7/3/2024  Patient name: Arely Wood  : 1950  MRN: [id number]  Referring provider: Divine Polk MD  Dx:   Encounter Diagnosis   Name Primary?    Alzheimer's dementia without behavioral disturbance (HCC) Yes         Subjective: pt reported doing well with completion of puzzles      Objective: Pt engaged in skilled OT treatment session with focus on fxnl cognition, short term memory, long term memory, and fxnl attention to increase engagement, endurance, tolerance, and independence with daily ADL and IADL tasks.     CPT Code Minutes                                           Task Details        Therapeutic Activity  Reviewed pt's HW    Memory book:  - discussed with pt the activities she engaged in since last session  - pt reported completing 4 word search puzzles and enjoyed them; reported needing increased time to complete puzzles  - pt reported enjoying playing LEILA with  and granddaughter  - pt reported cooking 2 meals for herself and ; pt reported no difficulty cooking meals; pt reported being unsatisfied with the result of 1 of the meals  - pt and her  walked around the mall for 2 hrs for physical activity   - Continued educating pt and  on importance of engagement in leisure activities for cognitive exercise; demo G understanding of education  - pt reports no difficulty remembering to engage in the leisure activities; pt's  reported having to remind pt to log activities into memory book  - pt's  reported that the pt places book in an overcrowded area in their kitchen; discussed with pt and pt's  new location for memory book at home   - identified location for pt to keep memory book in same place; also encouraged pt to put her puzzle book near the memory book as an additional reminder  - encouraged pt to log leisure activities into her memory book twice a day, once in the afternoon and evening, to help  her recall activities that she did during her day  - discussed with pt and pt's  about getting a weekly planner to log pt's activities     HW:  - encouraged pt to engage in at least 1 leisure activity a day and document it into her daily log of memory book  - identified 1 place to keep memory book to improve her recall and for improved consistent memory   - encouraged pt to log activities twice a day                    Neuro Re-Ed               Therapeutic Exercise               Manual          HEP  5/16: Cog HEP: Provided 4 categories. Pt to list 5 words in each category and then practice memory strategies to attempt recall     6/20: Initiated memory book          Assessment: Tolerated treatment well. Pt demo G engagement in leisure activities. Pt with min difficulty remembering to log leisure activities into memory book. Encouraged pt to relocate memory book and to log activities twice a day. Patient would benefit from continued skilled OT.      Plan: Continued skilled OT per POC    INTERVENTION COMMENTS:  Diagnosis: Alzheimer's dementia without behavioral disturbance (HCC) [G30.9, F02.80]  Precautions: impaired memory   Insurance: Payor: MEDICARE / Plan: MEDICARE A AND B / Product Type: Medicare A & B Fee for Service /   Visit: 11  PN due 7/12/24

## 2024-07-10 ENCOUNTER — OFFICE VISIT (OUTPATIENT)
Facility: CLINIC | Age: 74
End: 2024-07-10
Payer: MEDICARE

## 2024-07-10 DIAGNOSIS — G30.9 ALZHEIMER'S DEMENTIA WITHOUT BEHAVIORAL DISTURBANCE (HCC): Primary | ICD-10-CM

## 2024-07-10 DIAGNOSIS — F02.80 ALZHEIMER'S DEMENTIA WITHOUT BEHAVIORAL DISTURBANCE (HCC): Primary | ICD-10-CM

## 2024-07-10 PROCEDURE — 97530 THERAPEUTIC ACTIVITIES: CPT

## 2024-07-10 NOTE — PROGRESS NOTES
Occupational Therapy Progress Note:    Today's date: 7/10/2024  Patient name: Arely Wood  : 1950  MRN: [id number]  Referring provider: Zully Rutledge MD  Dx:   Encounter Diagnosis   Name Primary?    Alzheimer's dementia without behavioral disturbance (HCC) Yes         Subjective: pt had no new complaints      Objective: Pt engaged in skilled OT treatment session with focus on fxnl cognition, short term memory, long term memory, and fxnl attention to increase engagement, endurance, tolerance, and independence with daily ADL and IADL tasks.         Short Term Goals (to be achieved within 4 weeks):  Pt will maintain attention to task for 45 minutes in multimodal environment for improved memory/ immediate recall for increased engagement in IADLs and leisure activities MET   Pt will improve auditory and visual processing to follow 1 step directions with 60% accuracy for increased engagement in IADLs and leisure activities PROGRESSING  Pt will demo G recall of 50% of Written and verbal information utilizing memory strategy of choice for improved STM/delayed memory and improved ability to engage in ADLs/IADLs/ work and leisure activities.  PROGRESSING  Pt will identify 3-5 leisure activities that she enjoys, which incorporates cognitive and physical aspects to increase overall health and independence with ADLs/IADLs and leisure activities  MET    Long Term Goals (to be achieved within 12 weeks):    Pt will improve auditory and visual processing speed to follow 2 step directions with processing time of <1min and 80% accuracy for improved IADL performance and engagement in leisure activities. PROGRESSING  Pt will demo G carryover of use of internal/external memory strategy aides for improved recall of daily events, improved executive functioning with 70% accuracy PROGRESSING  Pt will consistently engage in weekly leisure activities to allow for optimal cognitive functioning. PROGRESSING   Pt will be S with  HEP, with assistance from   PROGRESSING      PATIENT GOAL: to improve memory and attention         PT Code Minutes                                           Task Details        Therapeutic Activity  Memory book:  - pt's  reports memory book being kept on kitchen island;  - pt demo F- carryover of compensatory strategies to log activities; pt's  continues to remind pt to log activities  - encouraged pt to log memory book as a daily habit at the end of each day to build into pt's existing routine         Engagement in leisure activity for cognitive exercise:  - pt consistently engaging in leisure activity every other day; working on increasing to daily engagement   - pt engages in leisure activities such as scrabble, LEILA, cooking new meals, and completing word searchers to increase cognitive exercise   - suggested pt begin reading magazines to improve her functional cognition, attention, and short term memory        Word-finding task:  - Pt completed word finding task naming items within a specified category in alphabetical order  - pt had max difficulty naming items in specified category  - modified task:eliminated specific category, continued to name items in alphabetical order  - pt required mod/max verbal cues for word-finding      HW:   - encouraged pt to engage in at least 1 leisure activity a day and document it into her daily log of memory book  - identified 1 place to keep memory book to improve her recall and for improved consistent memory   - encouraged pt to log daily and leisure activities at the end of each day                      Neuro Re-Ed               Therapeutic Exercise               Manual          HEP  5/16: Cog HEP: Provided 4 categories. Pt to list 5 words in each category and then practice memory strategies to attempt recall     6/20: Initiated memory book           Assessment: Tolerated treatment well. Since IE, pt with progress towards goal. Pt able to identify 3-5  leisure activities that she enjoys doing; pt demo increased engagement in leisure activities by consistently participating in 1 activity every other day. Pt has mod/max difficulty with divided attention tasks. Pt continues to have difficulty with auditory and visual processing of 1-2 step directions requiring mod/max verbal and visual cues for task completion. During recall and word-finding tasks, pt has mod/max difficulty with STM and delayed memory. Pt demo F- carryover of external memory aides using memory book to log daily and leisure activities for improving recall of daily events. Patient would benefit from continued skilled OT.      Plan: Continued skilled OT per POC    INTERVENTION COMMENTS:  Diagnosis: Alzheimer's dementia without behavioral disturbance (HCC) [G30.9, F02.80]  Precautions: impaired memory   Insurance: Payor: MEDICARE / Plan: MEDICARE A AND B / Product Type: Medicare A & B Fee for Service /   Visit: 12  PN due 8/1/24

## 2024-07-17 ENCOUNTER — OFFICE VISIT (OUTPATIENT)
Facility: CLINIC | Age: 74
End: 2024-07-17
Payer: MEDICARE

## 2024-07-17 DIAGNOSIS — F02.80 ALZHEIMER'S DEMENTIA WITHOUT BEHAVIORAL DISTURBANCE (HCC): Primary | ICD-10-CM

## 2024-07-17 DIAGNOSIS — G30.9 ALZHEIMER'S DEMENTIA WITHOUT BEHAVIORAL DISTURBANCE (HCC): Primary | ICD-10-CM

## 2024-07-17 PROCEDURE — 97530 THERAPEUTIC ACTIVITIES: CPT

## 2024-07-17 NOTE — PROGRESS NOTES
Occupational Therapy Daily Note:    Today's date: 2024  Patient name: Arely Wood  : 1950  MRN: [id number]  Referring provider: Mirtha Moreira MD  Dx:   Encounter Diagnosis   Name Primary?    Alzheimer's dementia without behavioral disturbance (HCC) Yes         Subjective: pt had no new complaints      Objective: Pt engaged in skilled OT treatment session with focus on fxnl cognition, short term memory, long term memory, and fxnl attention to increase engagement, endurance, tolerance, and independence with daily ADL and IADL tasks.       PT Code Minutes                                           Task Details        Therapeutic Activity  Memory Book:  -reviewed entries since last session. Pt filled out 1-2 leisure activities per day. Pt's  stating he has to remind pt daily to fill out memory book and that she has difficulty filling it out.  -changed format of logging leisure interests, switched to using a checklist habit tracker. Provided a July habit tracker, filled out leisure interests and instructed pt to put a check beny next to each activity that she completes daily. Pt with fair to good understanding; pt's  with G understanding. Also provided sheet with a dated daily log, in case pt was unable to figure out how to use the habit tracker.        Cognitive activity:  -pt able to read simple recipe from [employer] magazine with min cues and increased time. Pt with mod difficulty correctly identifying all 4 ingredients of recipe. Pt with max difficulty with processing steps of recipe for recall; unable to summarize steps after initial reading.              Homework:  Bring in instructions on Skip-Lorenzo to have therapist assist with understanding directions and how to play  Write up recipe for therapist on how to make Brownie Caramel pizza (original recipe in [employer] magazine that pt has)  Changed format of daily journal to use a checklist of leisure interests to attempt to  improve carryover               Neuro Re-Ed                 Therapeutic Exercise               Manual          HEP  5/16: Cog HEP: Provided 4 categories. Pt to list 5 words in each category and then practice memory strategies to attempt recall     6/20: Initiated memory book     7/17: Engagement in leisure activity to increase cognitive stimulation:   -engagement in at least 1 activity daily and document in activity book   -habit tracker for leisure engagement                 Assessment: Tolerated treatment well. Pt with difficulty implementing memory book at home; adjusted format to use a habit tracker checklist to keep track of daily activities. Pt with fair to good understanding. Will assess carryover at next session. Patient would benefit from continued skilled OT.      Plan: Continued skilled OT per POC    INTERVENTION COMMENTS:  Diagnosis: Alzheimer's dementia without behavioral disturbance (HCC) [G30.9, F02.80]  Precautions: impaired memory   Insurance: Payor: MEDICARE / Plan: MEDICARE A AND B / Product Type: Medicare A & B Fee for Service /   Visit: 13  PN due 8/1/24

## 2024-07-19 ENCOUNTER — OFFICE VISIT (OUTPATIENT)
Facility: CLINIC | Age: 74
End: 2024-07-19
Payer: MEDICARE

## 2024-07-19 DIAGNOSIS — G30.9 ALZHEIMER'S DEMENTIA WITHOUT BEHAVIORAL DISTURBANCE (HCC): Primary | ICD-10-CM

## 2024-07-19 DIAGNOSIS — F02.80 ALZHEIMER'S DEMENTIA WITHOUT BEHAVIORAL DISTURBANCE (HCC): Primary | ICD-10-CM

## 2024-07-19 PROCEDURE — 97530 THERAPEUTIC ACTIVITIES: CPT

## 2024-07-19 NOTE — PROGRESS NOTES
Occupational Therapy Daily Note:    Today's date: 2024  Patient name: Arely Wood  : 1950  MRN: [id number]  Referring provider: Divine Polk MD  Dx:   Encounter Diagnosis   Name Primary?    Alzheimer's dementia without behavioral disturbance (HCC) Yes       Subjective: pt had no new complaints      Objective: Pt engaged in skilled OT treatment session with focus on fxnl cognition, short term memory, long term memory, and fxnl attention to increase engagement, endurance, tolerance, and independence with daily ADL and IADL tasks.       PT Code Minutes                                           Task Details        Therapeutic Activity  Memory Book:  -Reviewed entries in new checklist habit tracker from this past week. Reviewed instructions for pt to put a check beny next to each activity that she completes daily. Pt with fair to good understanding; pt's  with G understanding. Pt's  reported they added morning medications to the tracker, with OTR demonstrating how to check off items once completed (such as meds).     Cognitive activity:  -Reviewed HW of writing up brownie caramel pizza in recipe book that pt has at home. Pt completed with G accuracy of recipe and step by step instructions of how to complete.   -Pt brought in skip-nevaeh card game, working on reading and comprehending written directions. Pt with max difficulty with processing steps of directions; unable to summarize steps after initial reading. Benefited from therapist acting out/ modeling instructions and playing with pt, with pt able to follow sequence of steps with max verbal cues.        Homework:  Continue to log habit tracker/ checklist at home to improve carryover of cognitively stimulating tasks as leisure activities within the home environment.               Neuro Re-Ed                 Therapeutic Exercise               Manual          HEP  : Cog HEP: Provided 4 categories. Pt to list 5 words in each category and  then practice memory strategies to attempt recall     6/20: Initiated memory book     7/17: Engagement in leisure activity to increase cognitive stimulation:   -engagement in at least 1 activity daily and document in activity book   -habit tracker for leisure engagement                 Assessment: Tolerated treatment well. Pt with difficulty implementing memory book at home; reviewing how to log new format and keep track of daily activities she engages in. Pt and caregiver verbalized G understanding following review. Will continue to assess carryover at next session. Pt continues to demonstrate difficulties with comprehension of multi-step directions, requiring max cues and task breakdown to improve comprehension of task. Patient would benefit from continued skilled OT.      Plan: Continued skilled OT per POC    INTERVENTION COMMENTS:  Diagnosis: Alzheimer's dementia without behavioral disturbance (HCC) [G30.9, F02.80]  Precautions: impaired memory   Insurance: Payor: MEDICARE / Plan: MEDICARE A AND B / Product Type: Medicare A & B Fee for Service /   Visit: 14  PN due 8/1/24

## 2024-07-24 ENCOUNTER — OFFICE VISIT (OUTPATIENT)
Facility: CLINIC | Age: 74
End: 2024-07-24
Payer: MEDICARE

## 2024-07-24 DIAGNOSIS — G30.9 ALZHEIMER'S DEMENTIA WITHOUT BEHAVIORAL DISTURBANCE (HCC): Primary | ICD-10-CM

## 2024-07-24 DIAGNOSIS — F02.80 ALZHEIMER'S DEMENTIA WITHOUT BEHAVIORAL DISTURBANCE (HCC): Primary | ICD-10-CM

## 2024-07-24 PROCEDURE — 97530 THERAPEUTIC ACTIVITIES: CPT

## 2024-07-24 NOTE — PROGRESS NOTES
Occupational Therapy Daily Note:    Today's date: 2024  Patient name: Arely Wood  : 1950  MRN: [id number]  Referring provider: Divine Polk MD  Dx:   Encounter Diagnosis   Name Primary?    Alzheimer's dementia without behavioral disturbance (HCC) Yes         Subjective: pt had no new complaints      Objective: Pt engaged in skilled OT treatment session with focus on fxnl cognition, short term memory, long term memory, and fxnl attention to increase engagement, endurance, tolerance, and independence with daily ADL and IADL tasks.       PT Code Minutes                                           Task Details        Therapeutic Activity    Reviewed memory book with pt and pt's :  - discussed how pt is doing using habit tracker; pt's  still reminding her to fill out the tracker at home  - reviewed with pt and pt's  on importance of habit tracker to build these activities into pt's normal routine; pt and pt's  demo G understanding  - pt had mod/max difficulty recalling activities completed since last session; recommended pt write down daily activities not listed on habit tracker as pt is unable remember specific details about activities completed  - pt attempted to play skip-nevaeh during last therapy session but reported not liking it; suggested playing skip-nevaeh with granddaughter   - during last session, pt wrote down caramel pizza brownie recipe from her recipe book but pt did not attempt to make it at home  - pt's  reported pt used to make her own brownie recipe at home; suggested pt try to make her brownie recipe at home       Word Finding Activity   - pt instructed to identify items around the room that started with a specific letter going from A through Z  - pt had max difficulty identifying items  - pt required max verbal cues   - pt completed letters A through G during session            Homework:  Pt given word finding activity to complete at home and to bring to  next session   Continue to log habit tracker/ checklist at home to improve carryover of cognitively stimulating tasks as leisure activities within the home environment.               Neuro Re-Ed                 Therapeutic Exercise               Manual          HEP  5/16: Cog HEP: Provided 4 categories. Pt to list 5 words in each category and then practice memory strategies to attempt recall     6/20: Initiated memory book     7/17: Engagement in leisure activity to increase cognitive stimulation:   -engagement in at least 1 activity daily and document in activity book   -habit tracker for leisure engagement                 Assessment: Tolerated treatment well. Pt demo G carryover of habit tracker. Pt continues to rely on  to remind her to fill out memory book as she has difficulty remembering. Suggested pt continue to write out activities not listed in the habit tracker to improve pt's memory of what she completed. Will continue to assess carryover at next session. Pt had max difficulty with word finding task and required max verbal cues for task execution. Patient would benefit from continued skilled OT.      Plan: Continued skilled OT per POC    INTERVENTION COMMENTS:  Diagnosis: Alzheimer's dementia without behavioral disturbance (HCC) [G30.9, F02.80]  Precautions: impaired memory   Insurance: Payor: MEDICARE / Plan: MEDICARE A AND B / Product Type: Medicare A & B Fee for Service /   Visit: 15  PN due 8/1/24

## 2024-07-26 ENCOUNTER — APPOINTMENT (OUTPATIENT)
Facility: CLINIC | Age: 74
End: 2024-07-26
Payer: MEDICARE

## 2024-07-31 ENCOUNTER — OFFICE VISIT (OUTPATIENT)
Facility: CLINIC | Age: 74
End: 2024-07-31
Payer: MEDICARE

## 2024-07-31 DIAGNOSIS — F02.80 ALZHEIMER'S DEMENTIA WITHOUT BEHAVIORAL DISTURBANCE (HCC): Primary | ICD-10-CM

## 2024-07-31 DIAGNOSIS — G30.9 ALZHEIMER'S DEMENTIA WITHOUT BEHAVIORAL DISTURBANCE (HCC): Primary | ICD-10-CM

## 2024-07-31 PROCEDURE — 97530 THERAPEUTIC ACTIVITIES: CPT

## 2024-07-31 NOTE — PROGRESS NOTES
Occupational Therapy Daily Note:    Today's date: 2024  Patient name: Arely Wood  : 1950  MRN: [id number]  Referring provider: Divine Polk MD  Dx:   Encounter Diagnosis   Name Primary?    Alzheimer's dementia without behavioral disturbance (HCC) Yes         Subjective: pt had no new complaints      Objective: Pt engaged in skilled OT treatment session with focus on fxnl cognition, short term memory, long term memory, and fxnl attention to increase engagement, endurance, tolerance, and independence with daily ADL and IADL tasks.       PT Code Minutes                                           Task Details        Therapeutic Activity    Reviewed memory binder with pt and pt's :  - pt updated her habit tracker and weekly schedule including additional activities she engaged in; required min/mod cues from pt's  to update it at home  - pt reported trying new leisure activity - going to the movies with her   - pt reads magazines and completes word search puzzles daily  - pt's  reported that they have decreased their time spent watching TV to promote increased engagement in cognitive and leisure activities  - pt reported having max difficulty with word searches; pt's  reported pt taking 3-4 hrs to find 2 words due to difficulty remembering what word or letter she is looking for; suggested pt use a visual cue such as circling or highlighting a word in the word bank to decrease time spent for completion    - pt's  stated pt has trouble locating diagonal words; pt given 2 word searches with horizontal and vertical words only   - pt's  asked about new activities for pt to complete at home; discussed with pt and pt's  about getting pt an activity book containing various cognitive activities   - pt given a habit tracker for August              Homework:  Word searches  Continue to log habit tracker at home to improve carryover of cognitively stimulating  tasks as leisure activities within the home environment.                      Neuro Re-Ed                 Therapeutic Exercise               Manual          HEP  5/16: Cog HEP: Provided 4 categories. Pt to list 5 words in each category and then practice memory strategies to attempt recall     6/20: Initiated memory book     7/17: Engagement in leisure activity to increase cognitive stimulation:   -engagement in at least 1 activity daily and document in activity book   -habit tracker for leisure engagement                 Assessment: Tolerated treatment well. Pt demo improved carryover of habit tracker. Pt becoming more independent with filling out the habit tracker and memory binder with decreased assistance from her . Discussed with pt and pt's  about trying new cognitive activities for pt to try at home to build on her existing engagement in leisure activities. Will continue to assess carryover at next session. Patient would benefit from continued skilled OT.      Plan: Continued skilled OT per POC    INTERVENTION COMMENTS:  Diagnosis: Alzheimer's dementia without behavioral disturbance (HCC) [G30.9, F02.80]  Precautions: impaired memory   Insurance: Payor: MEDICARE / Plan: MEDICARE A AND B / Product Type: Medicare A & B Fee for Service /   Visit: 16  PN due 8/1/24

## 2024-08-02 ENCOUNTER — OFFICE VISIT (OUTPATIENT)
Facility: CLINIC | Age: 74
End: 2024-08-02
Payer: MEDICARE

## 2024-08-02 DIAGNOSIS — F02.80 ALZHEIMER'S DEMENTIA WITHOUT BEHAVIORAL DISTURBANCE (HCC): Primary | ICD-10-CM

## 2024-08-02 DIAGNOSIS — G30.9 ALZHEIMER'S DEMENTIA WITHOUT BEHAVIORAL DISTURBANCE (HCC): Primary | ICD-10-CM

## 2024-08-02 PROCEDURE — 97168 OT RE-EVAL EST PLAN CARE: CPT

## 2024-08-02 PROCEDURE — 97530 THERAPEUTIC ACTIVITIES: CPT

## 2024-08-02 NOTE — PROGRESS NOTES
OCCUPATIONAL THERAPY RE-EVALUATION        8/2/24  Arely Wood  1950  [id number]  Divine Polk MD   Diagnosis ICD-10-CM Associated Orders   1. Alzheimer's dementia without behavioral disturbance (HCC)  G30.9     F02.80               Assessment/Plan      SKILLED ANALYSIS:    Pt is a 74 y.o. female referred to Occupational Therapy s/p Alzheimer's dementia without behavioral disturbance (HCC) [G30.9, F02.80].  Pt participated in 17 skilled OT sessions focused on functional cognition and increased engagement in leisure activities for improved cognitive stimulation. Pt demo G progression towards goals. Pt continues to have difficulty with functional attention completing the Trails A and [place] scanning assessments with increased time compared to her initial evaluation. Pt has max difficulty with divided attention, AEB her inability to complete the Trails B assessment. Pt has mod/max difficulty with auditory and visual processing of 1-2 step directions requiring mod/max verbal and visual cues for task completion. Implemented HEP using a memory book to promote increased engagement in leisure activities. Pt has been able to identify and participate in meaningful leisure activities on a daily basis. Pt has demo improved carryover of using memory book with min/mod assistance from pt's . Pt still presents with the following deficits: STM, LTM/delayed recall, processing speed, direction following, multitasking/dual tasking, attention, divided attention/alternating attention, and mental manipulation impacting ability to independently and safely complete ADL/IADL and leisure tasks. Pt does demo the need for continued skilled Occupational Therapy services 1-2x/week for 12 weeks with focus on ADL re-training, IADL re-training, fxnl cognition, short term memory, long term memory, fxnl attention, family training/education, and leisure pursuits to address the goals as listed below. Pt in agreement with POC, POC to  " 10/25/24.        Short Term Goals (to be achieved within 4 weeks):  Pt will maintain attention to task for 45 minutes in multimodal environment for improved memory/ immediate recall for increased engagement in IADLs and leisure activities PROGRESSING  Pt will improve auditory and visual processing to follow 1 step directions with 60% accuracy for increased engagement in IADLs and leisure activities PROGRESSING  Pt will demo G recall of 50% of Written and verbal information utilizing memory strategy of choice for improved STM/delayed memory and improved ability to engage in ADLs/IADLs/ work and leisure activities. PROGRESSING  Pt will identify 3-5 leisure activities that she enjoys, which incorporates cognitive and physical aspects to increase overall health and independence with ADLs/IADLs and leisure activities MET    Long Term Goals (to be achieved within 12 weeks):    Pt will improve auditory and visual processing speed to follow 2 step directions with processing time of <1min and 80% accuracy for improved IADL performance and engagement in leisure activities. PROGRESSING  Pt will demo G carryover of use of internal/external memory strategy aides for improved recall of daily events, improved executive functioning with 70% accuracy PROGRESSING  Pt will consistently engage in weekly leisure activities to allow for optimal cognitive functioning. MET  Pt will be S with HEP, with assistance from  PROGRESSING          SUBJECTIVE      SUBJECTIVE: \"These things are hard\"    PATIENT GOAL: to improve memory and attention         HISTORY OF PRESENT ILLNESS:     Pt is a 74 y.o. female who was referred to Occupational Therapy s/p  Alzheimer's dementia without behavioral disturbance (HCC) [G30.9, F02.80]. Pt under the care of neurology since  for declining memory, recently sought a second opinion after receiving Alzheimer's dementia diagnosis. Pt referred to OT to maximize cognitive functioning. Pt has also " "been referred for a  evaluation.       PMH: palpitations, AD, anxiety, insomnia, and hypercholesteremia.     Past Surgical Hx: No past surgical history on file.         HOME SETUP/ CLOF: lives with , 2 SH, 2 DIANE, power room on 1st floor; bed/ full bath on 2nd floor; laundry machines on 2nd floor     ADLs: I with all ADLs    IADLs: pt reports doing laundry and cooking; pt's  manages finances; pt manages own medications; uses a pill box   (-) ; stopped driving about 2 months ago; doctor reported to [place], with pt reporting she did surrender her license but is upset and wants to be able to return to driving. A  evaluation was ordered for further assessment of driving abilities and safety.     Functional Mobility: I with functional mobility; reports no difficulties with balance; (-) dizziness     Work: retired RN    Physical activity/ leisure engagement: walks with  2-3x/ week, gardening, \"I don't really know\"; update: pt continues to garden, now completes word searches, card games, and reading      Pain Levels: none          OBJECTIVE         PHYSICAL ASSESSMENT    R handed     UE ROM LUE AROM  RUE AROM COMMENTS   Shoulder Flex WNL WNL    Shoulder Ext WNL  WNL    Shoulder ABD WNL  WNL    Horizontal ABD WNL  WNL    Horizontal ADD WNL  WNL    Shoulder IR  WNL  WNL    Shoulder ER WNL WNL    Elbow Flex WNL WNL    Elbow Ext  WNL WNL    Supination  WNL WNL    Pronation  WNL WNL    Wrist Flex WNL WNL    Wrist Ext WNL WNL    Finger Flex WNL WNL    Finger Ext WNL WNL    Opposition  WNL WNL                               MMT  LUE RUE   Comments   Shoulder Flex/Ext 5/5 5/5    Shoulder Abd 5/5 5/5    Shoulder ER 5/5 5/5    Shoulder IR 5/5 5/5    Elbow Flex 5/5 5/5    Elbow Ext 5/5 5/5    Wrist Flex 5/5 5/5    Wrist Ext 5/5 5/5    Gross Grasp 5/5 5/5            SENSATION LUE RUE Comments   Sharp Dull  Intact Intact    Proprioception Intact Intact    Pain Intact Intact    Hot/Cold Temp " Intact Intact              COGNITIVE ASSESSMENT    Formal Assessments:  Trails A: 4 minutes 43 seconds, error after number 11 (Was 3 min 40 sec, errors after number 15)  Trails B: attempted, pt unable to complete  [place] symbol scannin min 13 sec missed 3 symbols (was 7 min 57 sec, missed 2 symbols)         Clinical Observation:  Memory: Impaired working memory; impaired STM; impaired LTM  Attention: impaired sustained attn; distracted by internal thoughts   Processing: delayed processing; difficulty answering simple questions; impaired comprehension of 1 step directions  Executive functioning: impaired mental manipulation   Communication: difficulty with expression and completing sentences     Cognitive Checklist: Patient indicated that she is experiencing the following symptoms:    Memory: Remembering what people have told you, Remembering peoples' names, Remembering the date/day of the week, and Keeping track of your appointments    Attention: Easily distracted, Dividing your attention (i.e., multi-tasking), and Losing your train of thought    Processing: Processing new information , Following directions, and Responding to questions in a timely manner     Executive Functions: Organizing your thoughts and Planning daily tasks    Communication: Word finding in conversation, Expressing thoughts and ideas fluently, and Expressing thoughts and ideas into writing    Visual: Losing spot on the page when reading            VISUAL ASSESSMENT        Vision Screen       ROM WFL    Tracking WFL    Saccades WFL    Convergence/ Divergence WFL        Today's session:  Reviewed memory book with pt and pt's       PLANNED THERAPY INTERVENTIONS:  Therapeutic activity  Therapeutic exercise  Neuromuscular re-education   Cognitive re-training   ADL re-training   IADL re-training  Internal and external memory aides  Sustained/alternating/divided attention  Memory and mental manipulation  Auditory processing with immediate  recall  Memory retention with immediate and delayed recall         INTERVENTION COMMENTS:  Diagnosis: Alzheimer's dementia without behavioral disturbance (HCC) [G30.9, F02.80]  Precautions: impaired memory   Insurance: Payor: MEDICARE / Plan: MEDICARE A AND B / Product Type: Medicare A & B Fee for Service /   Visit: 17   PN due 10/1/24

## 2024-08-09 ENCOUNTER — OFFICE VISIT (OUTPATIENT)
Facility: CLINIC | Age: 74
End: 2024-08-09
Payer: MEDICARE

## 2024-08-09 DIAGNOSIS — G30.9 ALZHEIMER'S DEMENTIA WITHOUT BEHAVIORAL DISTURBANCE (HCC): Primary | ICD-10-CM

## 2024-08-09 DIAGNOSIS — F02.80 ALZHEIMER'S DEMENTIA WITHOUT BEHAVIORAL DISTURBANCE (HCC): Primary | ICD-10-CM

## 2024-08-09 PROCEDURE — 97530 THERAPEUTIC ACTIVITIES: CPT

## 2024-08-09 NOTE — PROGRESS NOTES
Occupational Therapy Daily Note:    Today's date: 2024  Patient name: Arely Wood  : 1950  MRN: [id number]  Referring provider: Divine Polk MD  Dx:   Encounter Diagnosis   Name Primary?    Alzheimer's dementia without behavioral disturbance (HCC) Yes         Subjective: pt had no new complaints      Objective: Pt engaged in skilled OT treatment session with focus on fxnl cognition, short term memory, long term memory, and fxnl attention to increase engagement, endurance, tolerance, and independence with daily ADL and IADL tasks.       PT Code Minutes                                           Task Details        Therapeutic Activity    Reviewed memory binder with pt and pt's :  - pt able to update her habit tracker  - pt's  reported trying to remind pt on a daily basis to fill out habit tracker, however occasionally forgets to remind her; pt's  reported if he doesn't remind her she will forget and he will have to tell the pt what activities she completed   - asked pt about activities she completed during the past week but had max difficulty remembering details; requires mod/max assistance from  to verify details; suggested pt write down her daily activities once a day for improved recall during therapy sessions  - pt continues to reads magazines, completes word search puzzles, and plays Ronald with granddaughter  - when pt completes word searches, pt's  reported giving pt max verbal cues due to increased time for completion      Word-finding/memory activity  - Provided pt with 3 categories; pt instructed to identify 3 items within each category within the therapy room  - pt unable to independently identify items; pt had max difficulty with word-finding; required max verbal and visual cues for accurate word finding          Homework:  Continue to log habit tracker at home to improve carryover of cognitively stimulating tasks as leisure activities within the home  environment.                      Neuro Re-Ed                 Therapeutic Exercise               Manual          HEP  5/16: Cog HEP: Provided 4 categories. Pt to list 5 words in each category and then practice memory strategies to attempt recall     6/20: Initiated memory book     7/17: Engagement in leisure activity to increase cognitive stimulation:   -engagement in at least 1 activity daily and document in activity book   -habit tracker for leisure engagement                 Assessment: Tolerated treatment well. Compared to last session, pt's  reported pt requiring increased cueing to fill out habit tracker. Additionally, the pt has max difficulty recalling details of activities she completed during the week. Pt completed memory activity with max difficulty and required max cueing for accurate word-finding. Patient would benefit from continued skilled OT.      Plan: Continued skilled OT per POC    INTERVENTION COMMENTS:  Diagnosis: Alzheimer's dementia without behavioral disturbance (HCC) [G30.9, F02.80]  Precautions: impaired memory   Insurance: Payor: MEDICARE / Plan: MEDICARE A AND B / Product Type: Medicare A & B Fee for Service /   Visit: 18  PN: 10/1/24

## 2024-08-14 ENCOUNTER — OFFICE VISIT (OUTPATIENT)
Facility: CLINIC | Age: 74
End: 2024-08-14
Payer: MEDICARE

## 2024-08-14 DIAGNOSIS — F02.80 ALZHEIMER'S DEMENTIA WITHOUT BEHAVIORAL DISTURBANCE (HCC): Primary | ICD-10-CM

## 2024-08-14 DIAGNOSIS — G30.9 ALZHEIMER'S DEMENTIA WITHOUT BEHAVIORAL DISTURBANCE (HCC): Primary | ICD-10-CM

## 2024-08-14 PROCEDURE — 97530 THERAPEUTIC ACTIVITIES: CPT

## 2024-08-14 NOTE — PROGRESS NOTES
Occupational Therapy Daily Note:    Today's date: 2024  Patient name: Arely Wood  : 1950  MRN: [id number]  Referring provider: Divine Polk MD  Dx:   Encounter Diagnosis   Name Primary?    Alzheimer's dementia without behavioral disturbance (HCC) Yes         Subjective: pt had no new complaints      Objective: Pt engaged in skilled OT treatment session with focus on fxnl cognition, short term memory, long term memory, and fxnl attention to increase engagement, endurance, tolerance, and independence with daily ADL and IADL tasks.       PT Code Minutes                                           Task Details        Therapeutic Activity    Reviewed memory binder with pt and pt's :  - pt's  reported pt updating her habit tracker and weekly schedule with min/mod verbal cueing  - pt's  reported pt being more independent with choosing leisure activities by herself   - asked pt about activities she completed during the past week; pt had mod difficulty remembering details; requires mod assistance from  to verify details  - this week, pt and pt's  reported having less time for leisure engagement, however, pt gardened and cooked meals daily       Word Unscramble  - Pt instructed to create word based on scrambled letters and a hint provided describing the word  - pt able to complete 7 words with mod/max verbal and visual cueing; pt had mod/max difficulty with word-finding   - pt unable to complete due to increased time required; will continue to work on during next session         Homework:  Continue to log habit tracker at home to improve carryover of cognitively stimulating tasks as leisure activities within the home environment.             Neuro Re-Ed                 Therapeutic Exercise               Manual          HEP  : Cog HEP: Provided 4 categories. Pt to list 5 words in each category and then practice memory strategies to attempt recall     : Initiated  memory book     7/17: Engagement in leisure activity to increase cognitive stimulation:   -engagement in at least 1 activity daily and document in activity book   -habit tracker for leisure engagement                 Assessment: Tolerated treatment well. Pt has demo improved carryover of using habit tracker and weekly schedule. Pt's  reported pt independently choosing leisure activities. Pt continues to have mod/max difficulty with word-finding and requires mod/max cueing. Patient would benefit from continued skilled OT.      Plan: Continued skilled OT per POC    INTERVENTION COMMENTS:  Diagnosis: Alzheimer's dementia without behavioral disturbance (HCC) [G30.9, F02.80]  Precautions: impaired memory   Insurance: Payor: MEDICARE / Plan: MEDICARE A AND B / Product Type: Medicare A & B Fee for Service /   Visit: 19  PN: 10/1/24

## 2024-08-16 ENCOUNTER — OFFICE VISIT (OUTPATIENT)
Facility: CLINIC | Age: 74
End: 2024-08-16
Payer: MEDICARE

## 2024-08-16 DIAGNOSIS — G30.9 ALZHEIMER'S DEMENTIA WITHOUT BEHAVIORAL DISTURBANCE (HCC): Primary | ICD-10-CM

## 2024-08-16 DIAGNOSIS — F02.80 ALZHEIMER'S DEMENTIA WITHOUT BEHAVIORAL DISTURBANCE (HCC): Primary | ICD-10-CM

## 2024-08-16 PROCEDURE — 97530 THERAPEUTIC ACTIVITIES: CPT

## 2024-08-16 NOTE — PROGRESS NOTES
Occupational Therapy Daily Note:    Today's date: 2024  Patient name: Arely Wood  : 1950  MRN: [id number]  Referring provider: Divine Polk MD  Dx:   Encounter Diagnosis   Name Primary?    Alzheimer's dementia without behavioral disturbance (HCC) Yes         Subjective: pt had no new complaints      Objective: Pt engaged in skilled OT treatment session with focus on fxnl cognition, short term memory, long term memory, and fxnl attention to increase engagement, endurance, tolerance, and independence with daily ADL and IADL tasks.       PT Code Minutes                                           Task Details        Therapeutic Activity    Reviewed memory binder with pt and pt's :  - pt's  reported pt updating her habit tracker and weekly schedule with min/mod verbal cueing  - pt's  reports pt is more willing to write out her activities than before      Leisure engagement:  -  asked pt about activities she completed during the past week; pt had mod difficulty remembering details; requires mod assistance from  to verify details  - pt's  reported pt being more independent with choosing leisure activities by herself   - since last session, pt able to play micheal with her granddaughter, cooked spaghetti, and walked/shopped around different stores  - pt continues to garden everyday and complete word searches       Continued working on word unscramble:  - Pt instructed to create word based on scrambled letters and a hint provided describing the word  - pt able to complete 8/8 words with mod/max verbal and visual cueing; pt had mod/max difficulty with word-finding   - pt unable to complete due to increased time required; will continue to work on during next session         Homework:  Continue to log habit tracker at home to improve carryover of cognitively stimulating tasks as leisure activities within the home environment.             Neuro Re-Ed                 Therapeutic  Exercise               Manual          HEP  5/16: Cog HEP: Provided 4 categories. Pt to list 5 words in each category and then practice memory strategies to attempt recall     6/20: Initiated memory book     7/17: Engagement in leisure activity to increase cognitive stimulation:   -engagement in at least 1 activity daily and document in activity book   -habit tracker for leisure engagement                 Assessment: Tolerated treatment well. Since implementing tracking daily activities, pt demo improved recall of daily activities during therapy sessions. During today's session, pt had mod/max difficulty with word-finding and requires mod/max verbal and visual cueing. Patient would benefit from continued skilled OT.      Plan: Continued skilled OT per POC    INTERVENTION COMMENTS:  Diagnosis: Alzheimer's dementia without behavioral disturbance (HCC) [G30.9, F02.80]  Precautions: impaired memory   Insurance: Payor: MEDICARE / Plan: MEDICARE A AND B / Product Type: Medicare A & B Fee for Service /   Visit: 19  PN: 10/1/24

## 2024-08-19 ENCOUNTER — OFFICE VISIT (OUTPATIENT)
Facility: CLINIC | Age: 74
End: 2024-08-19
Payer: MEDICARE

## 2024-08-19 DIAGNOSIS — G30.9 ALZHEIMER'S DEMENTIA WITHOUT BEHAVIORAL DISTURBANCE (HCC): Primary | ICD-10-CM

## 2024-08-19 DIAGNOSIS — F02.80 ALZHEIMER'S DEMENTIA WITHOUT BEHAVIORAL DISTURBANCE (HCC): Primary | ICD-10-CM

## 2024-08-19 PROCEDURE — 97530 THERAPEUTIC ACTIVITIES: CPT

## 2024-08-19 NOTE — PROGRESS NOTES
Occupational Therapy Daily Note:    Today's date: 2024  Patient name: Arely Wood  : 1950  MRN: [id number]  Referring provider: Divine Polk MD  Dx:   Encounter Diagnosis   Name Primary?    Alzheimer's dementia without behavioral disturbance (HCC) Yes         Subjective: pt had no new complaints      Objective: Pt engaged in skilled OT treatment session with focus on fxnl cognition, short term memory, long term memory, and fxnl attention to increase engagement, endurance, tolerance, and independence with daily ADL and IADL tasks.       PT Code Minutes                                           Task Details        Therapeutic Activity    Reviewed memory binder with pt and pt's :  - pt's  reported pt updating her weekly schedule with min/mod verbal cueing  - pt's  reports providing no assistance for the pt to fill out her habit tracker; pt did not fill out habit tracker for 3 days  - during today's session, pt filled out missed days from habit tracker with mod verbal cues from  to recall activities completed   - taped note to memory binder as a visual reminder to fill out habit tracker     Leisure engagement:  -  asked pt about activities she completed during the past few days; pt had mod difficulty remembering details; requires mod assistance from  to verify details  - pt reported walking, shopping, gardening, working on puzzles, and spending time with family    Categorization worksheet:  Pt instructed to categorize words provided on the worksheet into 4 given categories.   - Pt required mod/max verbal cues to recall task directions  - pt had min/mod difficulty accurately categorizing words   - pt able to categorize 20/20 words with mod/max verbal cues  - increased time required     Word unscramble:  - Worksheet was incomplete from prior sessions  - during today's session, pt had max difficulty completing 1 word unscramble; pt was unable to complete with max verbal  and visual cueing   - pt instructed to complete worksheet for HW        Homework:  Continue to log habit tracker at home to improve carryover of cognitively stimulating tasks as leisure activities within the home environment.   Word unscramble worksheet            Neuro Re-Ed                 Therapeutic Exercise               Manual          HEP  5/16: Cog HEP: Provided 4 categories. Pt to list 5 words in each category and then practice memory strategies to attempt recall     6/20: Initiated memory book     7/17: Engagement in leisure activity to increase cognitive stimulation:   -engagement in at least 1 activity daily and document in activity book   -habit tracker for leisure engagement                 Assessment: Tolerated treatment well. Pt demo G carryover of daily journal in memory binder. Pt continues to require mod verbal cueing from pt's  to fill out habit tracker. Pt given visual reminder to complete habit tracker, will follow-up during next session. Pt had mod/max difficulty with word categorization and required increased cueing and time for task completion. Patient would benefit from continued skilled OT.      Plan: Continued skilled OT per POC    INTERVENTION COMMENTS:  Diagnosis: Alzheimer's dementia without behavioral disturbance (HCC) [G30.9, F02.80]  Precautions: impaired memory   Insurance: Payor: MEDICARE / Plan: MEDICARE A AND B / Product Type: Medicare A & B Fee for Service /   Visit: 21  PN: 10/1/24

## 2024-08-23 ENCOUNTER — OFFICE VISIT (OUTPATIENT)
Facility: CLINIC | Age: 74
End: 2024-08-23
Payer: MEDICARE

## 2024-08-23 DIAGNOSIS — F02.80 ALZHEIMER'S DEMENTIA WITHOUT BEHAVIORAL DISTURBANCE (HCC): Primary | ICD-10-CM

## 2024-08-23 DIAGNOSIS — G30.9 ALZHEIMER'S DEMENTIA WITHOUT BEHAVIORAL DISTURBANCE (HCC): Primary | ICD-10-CM

## 2024-08-23 PROCEDURE — 97530 THERAPEUTIC ACTIVITIES: CPT

## 2024-08-23 NOTE — PROGRESS NOTES
Occupational Therapy Daily Note:    Today's date: 2024  Patient name: Arely Wood  : 1950  MRN: [id number]  Referring provider: Divine Polk MD  Dx:   Encounter Diagnosis   Name Primary?    Alzheimer's dementia without behavioral disturbance (HCC) Yes           Subjective: pt had no new complaints      Objective: Pt engaged in skilled OT treatment session with focus on fxnl cognition, short term memory, long term memory, and fxnl attention to increase engagement, endurance, tolerance, and independence with daily ADL and IADL tasks.       PT Code Minutes                                           Task Details        Therapeutic Activity    Reviewed pt's HW:  - pt able to complete word unscramble  - pt's  reported giving pt mod cues to identify words    Reviewed memory binder with pt and pt's :  - pt's  reported pt updating her weekly schedule with min/mod verbal cueing  - pt's  reported pt is filling out weekly schedule more consistently  - pt able to fill out habit tracker with min/mod cues from pt's   - during today's session, pt had mod difficulty remembering what she checked off in the habit tracker  - pt demo mod/max difficulty remembering family members' names; assisted pt with writing down family members' names in her memory binder as a visual reminder when she has difficulty recalling    Leisure engagement:  -  asked pt about activities she completed during the past few days; pt had mod difficulty remembering details; requires mod assistance from  to verify details  - pt reported walking, shopping, gardening, working on puzzles, and cooking meals    Opposite word worksheet:  Pt instructed to identify opposite word of given terms.  - Pt had max difficulty with word-finding   - Pt required max verbal cues to identify word  - pt instructed to complete worksheet for HW               Homework:  Continue to log habit tracker at home to improve carryover of  cognitively stimulating tasks as leisure activities within the home environment.   Opposite word worksheet            Neuro Re-Ed                 Therapeutic Exercise               Manual          HEP  5/16: Cog HEP: Provided 4 categories. Pt to list 5 words in each category and then practice memory strategies to attempt recall     6/20: Initiated memory book     7/17: Engagement in leisure activity to increase cognitive stimulation:   -engagement in at least 1 activity daily and document in activity book   -habit tracker for leisure engagement                 Assessment: Tolerated treatment well. Pt demo F+/G- carryover of daily journal and habit tracker in memory binder. Pt's  reported pt with improved consistency filling out the daily journal. Pt required mod/max verbal cueing to remember family members' names. Pt demo increased difficulty with word-finding requiring max verbal cues and increased time. Patient would benefit from continued skilled OT.      Plan: Continued skilled OT per POC    INTERVENTION COMMENTS:  Diagnosis: Alzheimer's dementia without behavioral disturbance (HCC) [G30.9, F02.80]  Precautions: impaired memory   Insurance: Payor: MEDICARE / Plan: MEDICARE A AND B / Product Type: Medicare A & B Fee for Service /   Visit: 22  PN: 10/1/24

## 2024-08-30 ENCOUNTER — OFFICE VISIT (OUTPATIENT)
Facility: CLINIC | Age: 74
End: 2024-08-30
Payer: MEDICARE

## 2024-08-30 DIAGNOSIS — F02.80 ALZHEIMER'S DEMENTIA WITHOUT BEHAVIORAL DISTURBANCE (HCC): Primary | ICD-10-CM

## 2024-08-30 DIAGNOSIS — G30.9 ALZHEIMER'S DEMENTIA WITHOUT BEHAVIORAL DISTURBANCE (HCC): Primary | ICD-10-CM

## 2024-08-30 PROCEDURE — 97530 THERAPEUTIC ACTIVITIES: CPT

## 2024-08-30 NOTE — PROGRESS NOTES
Occupational Therapy Daily Note:    Today's date: 2024  Patient name: Arely Wood  : 1950  MRN: [id number]  Referring provider: Divine Polk MD  Dx:   Encounter Diagnosis   Name Primary?    Alzheimer's dementia without behavioral disturbance (HCC) Yes           Subjective: pt had no new complaints      Objective: Pt engaged in skilled OT treatment session with focus on fxnl cognition, short term memory, long term memory, and fxnl attention to increase engagement, endurance, tolerance, and independence with daily ADL and IADL tasks.       PT Code Minutes                                           Task Details        Therapeutic Activity  Medical Management:   -Spent time discussing pt's medical team; pt currently awaiting to see a new neurologist, as her last neurologist retired; pt having difficulty with night time medications and does need a neurologist for better medication management.  -pt reporting increased difficulty with sleeping, reporting after she takes night time medications, she has increased fears, nightmares, and even terrors from the shapes/ shadows in the house in the night. Pt and  report this happening over the last couple weeks. They plan on calling PCP today to assist, as they are currently waiting to get re-established at a new neurologist.   -Nutrition:  reporting pt with decreased appetite and decreased food intake. Suggested to bring up topic with PCP when calling today for further advice. Also recommended pt try nutrition shakes to add nutrients into diet, xander on days when food consumption is lower.  reporting they do have some nutrition shakes at home.  does encourage pt to increase food intake.         Memory binder:  -Habit tracker:  does provide reminder cues to fill out daily  -provided new habit tracker for Sept, carried over same leisure interests into new tracker  -memory log: pt filled out brief details 90% of the time; does rely  on  for assisting with recall of major events and details of events   -added in Sept schedule for OT visits into binder                     Homework:  Continue to log habit tracker at home to improve carryover of cognitively stimulating tasks as leisure activities within the home environment.   Simple problem solving worksheet, using a store directory to answer questions             Neuro Re-Ed                 Therapeutic Exercise               Manual          HEP  5/16: Cog HEP: Provided 4 categories. Pt to list 5 words in each category and then practice memory strategies to attempt recall     6/20: Initiated memory book     7/17: Engagement in leisure activity to increase cognitive stimulation:   -engagement in at least 1 activity daily and document in activity book   -habit tracker for leisure engagement                 Assessment: Tolerated treatment well. Pt cont to require assistance with completion of memory log and habit tracker. Pt with new night terrors/ fears after taking night time medication. Pt's  plans to call PCP for assistance with medication management. Patient would benefit from continued skilled OT.      Plan: Continued skilled OT per POC    INTERVENTION COMMENTS:  Diagnosis: Alzheimer's dementia without behavioral disturbance (HCC) [G30.9, F02.80]  Precautions: impaired memory   Insurance: Payor: MEDICARE / Plan: MEDICARE A AND B / Product Type: Medicare A & B Fee for Service /   Visit: 23  PN: 10/1/24

## 2024-09-10 ENCOUNTER — OFFICE VISIT (OUTPATIENT)
Facility: CLINIC | Age: 74
End: 2024-09-10
Payer: MEDICARE

## 2024-09-10 DIAGNOSIS — G30.9 ALZHEIMER'S DEMENTIA WITHOUT BEHAVIORAL DISTURBANCE (HCC): Primary | ICD-10-CM

## 2024-09-10 DIAGNOSIS — F02.80 ALZHEIMER'S DEMENTIA WITHOUT BEHAVIORAL DISTURBANCE (HCC): Primary | ICD-10-CM

## 2024-09-10 PROCEDURE — 97530 THERAPEUTIC ACTIVITIES: CPT

## 2024-09-10 NOTE — PROGRESS NOTES
Occupational Therapy Daily Note:    Today's date: 9/10/2024  Patient name: Arely Wood  : 1950  MRN: [id number]  Referring provider: Divine Polk MD  Dx:   Encounter Diagnosis   Name Primary?    Alzheimer's dementia without behavioral disturbance (HCC) Yes           Subjective: pt had no new complaints      Objective: Pt engaged in skilled OT treatment session with focus on fxnl cognition, short term memory, long term memory, and fxnl attention to increase engagement, endurance, tolerance, and independence with daily ADL and IADL tasks.       PT Code Minutes                                           Task Details        Therapeutic Activity  Cognitive scavenger hunt:  -pt tasked with locating a therapist in the clinic across the waiting room and asking him a simple question. Pt with max difficulty with comprehension of directions; unable to process task. Modified task for pt to locate another therapist and give him a piece of paper. Pt required max assist and verbal cues to write down task directions and then max cues to execute task. Pt able to locate clinic on the other side of the waiting room but was unable to recall how to locate the therapist. Pt given max verbal and visual cues to locate therapist. Once therapist was located, pt was unable to recall why she was looking for him, even when cued to use paper in her hands as a reminder.       Cognitive activity:  Pt engaged in tabletop worksheet consisting of using a calendar to answer simple questions. Pt required mod/max cues to answer 6/10 questions, increased time required; mod/max assist with processing and problem solving. Pt to complete worksheet for homework.                   Follow-up at next session:  -difficulty sleeping after night time medications/ night terrors  -nutrition/ decreased appetite   -Homework: completion of Simple problem solving worksheet, using a calendar to answer questions              Neuro Re-Ed                  Therapeutic Exercise               Manual          HEP  Continue to log habit tracker at home to improve carryover of cognitively stimulating tasks as leisure activities within the home environment.      6/20: Initiated memory book     7/17: Engagement in leisure activity to increase cognitive stimulation:   -engagement in at least 1 activity daily and document in activity book   -habit tracker for leisure engagement                 Assessment: Tolerated treatment well. Pt with max difficulty with simple cognitive scavenger hunt, requiring max cues for task execution. Patient would benefit from continued skilled OT.      Plan: Continued skilled OT per POC    INTERVENTION COMMENTS:  Diagnosis: Alzheimer's dementia without behavioral disturbance (HCC) [G30.9, F02.80]  Precautions: impaired memory   Insurance: Payor: MEDICARE / Plan: MEDICARE A AND B / Product Type: Medicare A & B Fee for Service /   Visit: 24  PN: 10/1/24

## 2024-09-17 ENCOUNTER — OFFICE VISIT (OUTPATIENT)
Facility: CLINIC | Age: 74
End: 2024-09-17
Payer: MEDICARE

## 2024-09-17 DIAGNOSIS — G30.9 ALZHEIMER'S DEMENTIA WITHOUT BEHAVIORAL DISTURBANCE (HCC): Primary | ICD-10-CM

## 2024-09-17 DIAGNOSIS — F02.80 ALZHEIMER'S DEMENTIA WITHOUT BEHAVIORAL DISTURBANCE (HCC): Primary | ICD-10-CM

## 2024-09-17 PROCEDURE — 97530 THERAPEUTIC ACTIVITIES: CPT

## 2024-09-17 NOTE — PROGRESS NOTES
Occupational Therapy Daily Note:    Today's date: 2024  Patient name: Arely Wood  : 1950  MRN: [id number]  Referring provider: Divine Polk MD  Dx:   Encounter Diagnosis   Name Primary?    Alzheimer's dementia without behavioral disturbance (HCC) Yes           Subjective: pt and  reporting pt with increased confusion this am, slowly improving as morning progressed.       Objective: Pt engaged in skilled OT treatment session with focus on fxnl cognition, short term memory, long term memory, and fxnl attention to increase engagement, endurance, tolerance, and independence with daily ADL and IADL tasks.       PT Code Minutes                                           Task Details        Therapeutic Activity  Health management:  -Sleeping: pt's  followed-up with pt's doctors concerning side effects of medication causing night terrors; medication was stopped; pt reporting no further night time hallucinations/ fears, however reports cont fear during bedtime, requiring increased support from  to fall asleep. Discussed use of melatonin and magnesium to assist with sleep. Pt reporting she has tried melatonin in the past, without negative side effects and can attempt using again to aide in increasing sleep time/ improving sleep hygiene.   -Nutrition: spent time discussing healthy nutrition. Pt with decreased appetite and decreased caloric consumption, as well as low water intake. Pt's  reporting he continues to encourage pt to eat at all meals and drink water but pt does cont to have difficulty. Discussed with pt the importance of proper nutrition and hydration. Discussed the amount of water she should be having in a day, approx 80 ounces, set goal to consume 60 ounces daily; also set goal to have a supplemental nutrition shake daily to improve intake of needed vitamins and minerals; pt with G understanding and in agreement.              Memory Binder:  -pt not filling out a daily  journal but does cont to complete the habit tracker  -added increasing water intake and nutrition shake to daily habit tracker  -pt's homework to address improving nutrition and sleep hygiene                   Neuro Re-Ed                 Therapeutic Exercise               Manual          HEP  Continue to log habit tracker at home to improve carryover of cognitively stimulating tasks as leisure activities within the home environment.      6/20: Initiated memory book     7/17: Engagement in leisure activity to increase cognitive stimulation:   -engagement in at least 1 activity daily and document in activity book   -habit tracker for leisure engagement                 Assessment: Tolerated treatment well. Session focused on improving daily habits of water and nutrition intake, as well as sleep hygiene to improve overall health and cognitive functioning. Pt with G tra. Patient would benefit from continued skilled OT.      Plan: Continued skilled OT per POC    INTERVENTION COMMENTS:  Diagnosis: Alzheimer's dementia without behavioral disturbance (HCC) [G30.9, F02.80]  Precautions: impaired memory   Insurance: Payor: MEDICARE / Plan: MEDICARE A AND B / Product Type: Medicare A & B Fee for Service /   Visit: 25  PN: 10/1/24

## 2024-09-24 ENCOUNTER — OFFICE VISIT (OUTPATIENT)
Facility: CLINIC | Age: 74
End: 2024-09-24
Payer: MEDICARE

## 2024-09-24 DIAGNOSIS — F02.80 ALZHEIMER'S DEMENTIA WITHOUT BEHAVIORAL DISTURBANCE (HCC): Primary | ICD-10-CM

## 2024-09-24 DIAGNOSIS — G30.9 ALZHEIMER'S DEMENTIA WITHOUT BEHAVIORAL DISTURBANCE (HCC): Primary | ICD-10-CM

## 2024-09-24 PROCEDURE — 97530 THERAPEUTIC ACTIVITIES: CPT

## 2024-09-24 NOTE — PROGRESS NOTES
"Occupational Therapy Daily Note:    Today's date: 2024  Patient name: Arely Wood  : 1950  MRN: [id number]  Referring provider: Divine Polk MD  Dx:   Encounter Diagnosis   Name Primary?    Alzheimer's dementia without behavioral disturbance (HCC) Yes         Subjective: \"I've been busy with the water\"      Objective: Pt engaged in skilled OT treatment session with focus on fxnl cognition, short term memory, long term memory, and fxnl attention to increase engagement, endurance, tolerance, and independence with daily ADL and IADL tasks.       PT Code Minutes                                           Task Details        Therapeutic Activity  Health management:   -Nutrition: pt's daughter has purchased her a 64 ounce water container, with hourly markings on the bottle to track progress. Pt and pt's  reporting increased water intake since last session. Pt also now consuming supplemental nutrition shake daily to improve intake of needed vitamins and minerals             Memory Binder:  -pt not filling out a daily journal but does cont to complete the habit tracker; pt able to fill out habit tracker once cued by .   -added increasing water intake and nutrition shake to daily habit tracker; pt consistent with both since last session          Cognitive task:  -pt given instruction to locate a [facility] and give a message about a supply order. Reviewed instructions with pt multiple times prior to attempt to execute. With min cueing, pt able to write down pertinent information to recall task. Pt was then able to execute task with min difficulty. Pt was I with locating staff member, able to use notes to correctly convey message, min increased time for processing when reading notes before delivering message.   -Delayed recall: pt able to recall steps of task and what the task entailed with min cueing    -Reviewed 2 specific staff members and where they are located in the therapy clinic. Pt tasked with " recalling the 2 staff members for next session. With min/mod cueing, pt able to write down the names of the staff members and where to locate them in the therapy clinic for reference at next session.             Neuro Re-Ed                 Therapeutic Exercise               Manual          HEP  Continue to log habit tracker at home to improve carryover of cognitively stimulating tasks as leisure activities within the home environment.      6/20: Initiated memory book     7/17: Engagement in leisure activity to increase cognitive stimulation:   -engagement in at least 1 activity daily and document in activity book   -habit tracker for leisure engagement                 Assessment: Tolerated treatment well. Pt with improved word finding and expression when talking to other staff members to find information and relay information. Pt with decreased cues for use of memory compensatory strategies. Patient would benefit from continued skilled OT.      Plan: Continued skilled OT per POC    INTERVENTION COMMENTS:  Diagnosis: Alzheimer's dementia without behavioral disturbance (HCC) [G30.9, F02.80]  Precautions: impaired memory   Insurance: Payor: MEDICARE / Plan: MEDICARE A AND B / Product Type: Medicare A & B Fee for Service /   Visit: 26  PN: 10/1/24

## 2024-10-01 ENCOUNTER — OFFICE VISIT (OUTPATIENT)
Facility: CLINIC | Age: 74
End: 2024-10-01
Payer: MEDICARE

## 2024-10-01 DIAGNOSIS — G30.9 ALZHEIMER'S DEMENTIA WITHOUT BEHAVIORAL DISTURBANCE (HCC): Primary | ICD-10-CM

## 2024-10-01 DIAGNOSIS — F02.80 ALZHEIMER'S DEMENTIA WITHOUT BEHAVIORAL DISTURBANCE (HCC): Primary | ICD-10-CM

## 2024-10-01 PROCEDURE — 97530 THERAPEUTIC ACTIVITIES: CPT

## 2024-10-01 NOTE — PROGRESS NOTES
Occupational Therapy Daily Note:    Today's date: 10/1/2024  Patient name: Arely Wood  : 1950  MRN: [id number]  Referring provider: Divine Polk MD  Dx:   Encounter Diagnosis   Name Primary?    Alzheimer's dementia without behavioral disturbance (HCC) Yes         Subjective: no complaints       Objective: Pt engaged in skilled OT treatment session with focus on fxnl cognition, short term memory, long term memory, and fxnl attention to increase engagement, endurance, tolerance, and independence with daily ADL and IADL tasks.       PT Code Minutes                                           Task Details        Therapeutic Activity  Health management:   -Nutrition: pt reporting consistently consuming a supplemental nutrition shake daily, increasing water intake, and improved sleeping at night. Pt reporting no further night terrors and is sleeping well.              Memory Binder:  -pt not filling out a daily journal but does cont to complete the habit tracker; pt able to fill out habit tracker once cued by .            Cognitive scavenger hunt:  -reviewed 3 staff members names and where to locate them in the clinic, 2 of which were reviewed at last session. Pt required min cues to correctly recall names but was able to recall where the staff was located.   -pt tasked to locate the 3 staff members and find out when their birthdays were. Pt required min cues to initiate task; able to locate 1/3 staff without cues and min cues to locate the remaining 2; required mod/max cues to recall what question to ask staff members; able to ask with min/mod difficulty with word finding and sentence structure; min cues for carryover of strategy to write down information. At completion of that task, pt instructed to ask a 4th staff member for their bday. Pt with G+ sentence structure and word finding to formulate question, min/mod cues to correctly write down answer.   -at end of task, reviewed all 4 staff members,  their names, their bdays and location in the clinic. Pt with G recall of 3/4 staff member locations and G- recall of names.              Neuro Re-Ed                 Therapeutic Exercise               Manual          HEP  Continue to log habit tracker at home to improve carryover of cognitively stimulating tasks as leisure activities within the home environment.      6/20: Initiated memory book     7/17: Engagement in leisure activity to increase cognitive stimulation:   -engagement in at least 1 activity daily and document in activity book   -habit tracker for leisure engagement                 Assessment: Tolerated treatment well. Pt with improved recall with verbal review and practice. Improved social interactions and conversations, with improved sentence structure and word finding. Patient would benefit from continued skilled OT.      Plan: Continued skilled OT per POC    INTERVENTION COMMENTS:  Diagnosis: Alzheimer's dementia without behavioral disturbance (HCC) [G30.9, F02.80]  Precautions: impaired memory   Insurance: Payor: MEDICARE / Plan: MEDICARE A AND B / Product Type: Medicare A & B Fee for Service /   Visit: 27  PN: 10/1/24

## 2024-10-08 ENCOUNTER — OFFICE VISIT (OUTPATIENT)
Facility: CLINIC | Age: 74
End: 2024-10-08
Payer: MEDICARE

## 2024-10-08 DIAGNOSIS — F02.80 ALZHEIMER'S DEMENTIA WITHOUT BEHAVIORAL DISTURBANCE (HCC): Primary | ICD-10-CM

## 2024-10-08 DIAGNOSIS — G30.9 ALZHEIMER'S DEMENTIA WITHOUT BEHAVIORAL DISTURBANCE (HCC): Primary | ICD-10-CM

## 2024-10-08 PROCEDURE — 97530 THERAPEUTIC ACTIVITIES: CPT

## 2024-10-08 NOTE — PROGRESS NOTES
"Occupational Therapy Progress Note:    Today's date: 10/8/2024  Patient name: Arely Wood  : 1950  MRN: [id number]  Referring provider: Divine Polk MD  Dx:   Encounter Diagnosis   Name Primary?    Alzheimer's dementia without behavioral disturbance (HCC) Yes         Subjective: \"It was a lot; I don't know if I can remember it next time\"      Objective: Pt engaged in skilled OT treatment session with focus on fxnl cognition, short term memory, long term memory, and fxnl attention to increase engagement, endurance, tolerance, and independence with daily ADL and IADL tasks.     Short Term Goals (to be achieved within 4 weeks):  Pt will maintain attention to task for 45 minutes in multimodal environment for improved memory/ immediate recall for increased engagement in IADLs and leisure activities MET  Pt will improve auditory and visual processing to follow 1 step directions with 60% accuracy for increased engagement in IADLs and leisure activities PROGRESSING  Pt will demo G recall of 50% of Written and verbal information utilizing memory strategy of choice for improved STM/delayed memory and improved ability to engage in ADLs/IADLs/ work and leisure activities. PROGRESSING  Pt will identify 3-5 leisure activities that she enjoys, which incorporates cognitive and physical aspects to increase overall health and independence with ADLs/IADLs and leisure activities MET    Long Term Goals (to be achieved within 12 weeks):    Pt will improve auditory and visual processing speed to follow 2 step directions with processing time of <1min and 80% accuracy for improved IADL performance and engagement in leisure activities. PROGRESSING  Pt will demo G carryover of use of internal/external memory strategy aides for improved recall of daily events, improved executive functioning with 70% accuracy PROGRESSING  Pt will consistently engage in weekly leisure activities to allow for optimal cognitive functioning. MET  Pt " will be S with HEP, with assistance from  PROGRESSING      PATIENT GOAL: to improve memory and attention       PT Code Minutes                                           Task Details        Therapeutic Activity  Cognitive scavenger hunt:  -reviewed 6 staff members names and where to locate them in the clinic, 4 of which were reviewed at last session. Pt able to correctly recall and locate 5/6 staff members.   -pt tasked to locate 6 staff members and ask what their favorite Halloween costume was. Pt required min cues to initiate task; able to independently locate 4 staff members, required min cues to locate remaining 2 staff; required min/mod verbal/visual cues to correctly ask staff members the question; min difficulty with word finding and sentence structure; able to carryover strategy of writing down the answers independently.   -at completion of task, reviewed therapists, their location in the clinic and their answers. Pt with G recall of therapists and their locations   -at end of session, reviewed how pt completed the task, with improvements with recall of staff members and location of staff, as well as increased ease with communications. Discussed application and carryover to home tasks, such as with cooking. Recommended consistently making favorite recipes to increase practice and eventual ease with completion. Pt and  with G understanding.     Homework: pt to look for favorite recipes and bring in for next session; can place recipes in sheet protector and use dry erase marker to keep track of ingredients/ spot in recipe                       Neuro Re-Ed                 Therapeutic Exercise               Manual          HEP  Continue to log habit tracker at home to improve carryover of cognitively stimulating tasks as leisure activities within the home environment.      6/20: Initiated memory book     7/17: Engagement in leisure activity to increase cognitive stimulation:   -engagement in at  least 1 activity daily and document in activity book   -habit tracker for leisure engagement                 Assessment: Tolerated treatment well. Pt with G progress towards goals, with noted improved carryover of memory compensatory strategies, with min cues. Pt with improving verbal communications and expression during social conversations. With cues from , pt able to consistently fill out daily habit tracker to increase participation in leisure activities for increased cognitive engagement.  Patient would benefit from continued skilled OT.      Plan: Continued skilled OT per POC    INTERVENTION COMMENTS:  Diagnosis: Alzheimer's dementia without behavioral disturbance (HCC) [G30.9, F02.80]  Precautions: impaired memory   Insurance: Payor: MEDICARE / Plan: MEDICARE A AND B / Product Type: Medicare A & B Fee for Service /   Visit: 28  PN: 10/1/24

## 2024-10-17 ENCOUNTER — OFFICE VISIT (OUTPATIENT)
Facility: CLINIC | Age: 74
End: 2024-10-17
Payer: MEDICARE

## 2024-10-17 DIAGNOSIS — F02.80 ALZHEIMER'S DEMENTIA WITHOUT BEHAVIORAL DISTURBANCE (HCC): Primary | ICD-10-CM

## 2024-10-17 DIAGNOSIS — G30.9 ALZHEIMER'S DEMENTIA WITHOUT BEHAVIORAL DISTURBANCE (HCC): Primary | ICD-10-CM

## 2024-10-17 PROCEDURE — 97530 THERAPEUTIC ACTIVITIES: CPT

## 2024-10-17 NOTE — PROGRESS NOTES
Occupational Therapy Daily Note:    Today's date: 10/17/2024  Patient name: Arely Wood  : 1950  MRN: [id number]  Referring provider: Divine Polk MD  Dx:   Encounter Diagnosis   Name Primary?    Alzheimer's dementia without behavioral disturbance (HCC) Yes         Subjective: no new complaints       Objective: Pt engaged in skilled OT treatment session with focus on fxnl cognition, short term memory, long term memory, and fxnl attention to increase engagement, endurance, tolerance, and independence with daily ADL and IADL tasks.         PT Code Minutes                                           Task Details        Therapeutic Activity  Memory Book:  -provided new daily tracker for October; updated list of tasks and leisure activities for engagement; will cont to explore leisure activities to increase socialization and communication  -Reviewed daily tasks: pt's  provides reminders and encouragement to increase food and water intake  -Reviewed pt's daily routines, bedtime rituals and emotional health. Pt reporting decreased fear at night time and increased ease with sleeping.   -Medication: pt does take morning pills with ; question of consistency with remembering to use medicated eye drops; discussed various options of external memory cues ; pt to trial using eye drops downstairs when  can monitor for consistency   -Food/ cooking/meal prep: pt with decreased engagement in cooking; discussed meal service options and pre-made food at the grocery stores to decrease cooking demands            Next session:   -expand HEP to include activities to increase communication and socialization   -follow-up on change in eye drop routine   - options         Neuro Re-Ed                 Therapeutic Exercise               Manual          HEP  Continue to log habit tracker at home to improve carryover of cognitively stimulating tasks as leisure activities within the home environment.       6/20: Initiated memory book     7/17: Engagement in leisure activity to increase cognitive stimulation:   -engagement in at least 1 activity daily and document in activity book   -habit tracker for leisure engagement                 Assessment: Tolerated treatment well. Pt with improving sleep patterns; cues for consistent food/water intake; working on ensuring consistent use of medicated eye drops; will cont to monitor and alter routine as indicated. Patient would benefit from continued skilled OT.      Plan: Continued skilled OT per POC    INTERVENTION COMMENTS:  Diagnosis: Alzheimer's dementia without behavioral disturbance (HCC) [G30.9, F02.80]  Precautions: impaired memory   Insurance: Payor: MEDICARE / Plan: MEDICARE A AND B / Product Type: Medicare A & B Fee for Service /   Visit: 29  PN: 10/1/24

## 2024-10-21 ENCOUNTER — OFFICE VISIT (OUTPATIENT)
Dept: NEUROLOGY | Facility: CLINIC | Age: 74
End: 2024-10-21
Payer: MEDICARE

## 2024-10-21 VITALS — DIASTOLIC BLOOD PRESSURE: 70 MMHG | SYSTOLIC BLOOD PRESSURE: 130 MMHG | HEART RATE: 70 BPM

## 2024-10-21 DIAGNOSIS — R41.3 MEMORY LOSS: ICD-10-CM

## 2024-10-21 DIAGNOSIS — F02.80 ALZHEIMER'S DEMENTIA WITHOUT BEHAVIORAL DISTURBANCE (HCC): Primary | ICD-10-CM

## 2024-10-21 DIAGNOSIS — G30.9 ALZHEIMER'S DEMENTIA WITHOUT BEHAVIORAL DISTURBANCE (HCC): Primary | ICD-10-CM

## 2024-10-21 PROCEDURE — 99214 OFFICE O/P EST MOD 30 MIN: CPT | Performed by: STUDENT IN AN ORGANIZED HEALTH CARE EDUCATION/TRAINING PROGRAM

## 2024-10-21 RX ORDER — TRAZODONE HYDROCHLORIDE 50 MG/1
TABLET, FILM COATED ORAL
COMMUNITY
Start: 2024-08-07

## 2024-10-21 RX ORDER — DONEPEZIL HYDROCHLORIDE 5 MG/1
5 TABLET, FILM COATED ORAL
Qty: 90 TABLET | Refills: 3 | Status: SHIPPED | OUTPATIENT
Start: 2024-10-21 | End: 2024-10-21

## 2024-10-21 NOTE — PROGRESS NOTES
Patient ID: Arely Wood is a 74 y.o. female.    Assessment/Plan:    Diagnoses and all orders for this visit:    Alzheimer's dementia without behavioral disturbance (HCC)    Patient is a 73 year old female presenting to the clinic today in regards to a second opinion for her dementia. Patient has a PMH of palpitations, AD, anxiety, insomnia, and hypercholesteremia.     Given patient's history of being reviewed from [facility] notes along with patient and her  confirmed that, there seems to be a mismatch in regards to patient's progression for dementia in just 3 years versus possibly other underlying etiology.  There has been a gradual decline but patient is still able to maintain all of her ADLs on her own.  Seems to be that patient is much better at home but on testing she appears to be worse.  Patient's  is concerned that this is possibly not dementia. Unfortunately, neuroquant imaging was unable to be completed. Offered repeat testing, pt deferred at this time.     MRI Brain wo contrast 1/26/23: No change. No mass. Moderate to marked cerebral white matter disease. That appearance is likely due to chronic small vessel ischemic disease.       Plan:  Continue Aricept 5 mg   Continue Remeron 7.5 mg for sleep  MRI Brain Neuroquant w wo contrast  Neuropsych testing ordered  Monitor for worsening symptoms  Call for any questions or concerns      The patient indicates understanding of these issues and agrees with the plan.    No follow-ups on file.    Subjective:    HPI    Patient is a 73 year old female presenting to the clinic today in regards to a second opinion for her dementia. Patient has a PMH of palpitations, AD, anxiety, insomnia, and hypercholesteremia.     Patient has been seeing [facility] Neurology, Dr. Emilie Briggs, since 2/4/21 for memory changes. At that time, the patient's MMSE score was a 30/30. Given that, the patient was sent for neuropsychological testing. Patient wanted to defer  the MRI so it was deferred. Neuropsych testing was done on 5/3/21 and patient was deemed as mild cognitive impairment with her performance variable across different measures. On 3/2/22, the patient had a slight decline on her exam in comparison to the initial exam and as a result, the patient was started on Aricept 5 mg. Concomitantly, she had to stop the Celexa which she was taking. On 11/29/22 at the office visit, the patient was tachycardiac, anxious, memory was worse, couldn't complete sentences, and was having difficulties finding words. So, they decided to stop the Aricept and resume the Celexa. On 1/31/223, the patient called to state that she never actually started the Celexa after stopping the Aricept and asked if she can continue the Aricept once again. When the patient was seen on 3/8/23, the patient was back on Aricept but struggling with sleep despite using melatonin. On 10/12/23, the patient was started on trazodone given the side effects with Mirtazepine. She was also offered an evaluation at [facility] for Lecanumab despite the history of 2 microhemorrhages. On 12/27/23, the patient saw [facility]iatric medicine who determined that she was not a candidate for Lecanumab given the 7/30 on SLUMS and her Aricept was stopped due the side effects. That day she was switched back to Mirtazapine for mood.    Now she presents to [facility] for a second opinion. They are very upset that at the Geriatric medicine visit, the doctor took away the patient's license. According to the patient and her , the patient is still able to maintain all of her ADLs on her own. She started having difficulties with numbers approximately 6 months and that is when her  took over the finances. Patient's  states that he really does not believe that she has dementia given its just her memory that is affected.     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    Patient denies depression, sleep disturbances,or agitation. Of note, the patient still continues to take her Aricept.    The following portions of the patient's history were reviewed and updated as appropriate: She  has no past medical history on file.     Patient Active Problem List    Diagnosis Date Noted    Alzheimer's dementia without behavioral disturbance (HCC) 12/27/2023     She  has no past surgical history on file.  Her Family history is unknown by patient.  She  reports that she does not drink alcohol and does not use drugs. No history on file for tobacco use.  Current Outpatient Medications   Medication Sig Dispense Refill    metoprolol succinate (TOPROL-XL) 25 mg 24 hr tablet Take 25 mg by mouth daily      brimonidine tartrate 0.2 % ophthalmic solution INSTILL 1 DROP TWICE A DAY IN THE LEFT EYE (Patient not taking: Reported on 10/21/2024)      donepezil (ARICEPT) 5 mg tablet  (Patient not taking: Reported on 10/21/2024)      Lumigan 0.01 % ophthalmic drops INSTILL 1 DROP INTO LEFT EYE AT BEDTIME (Patient not taking: Reported on 10/21/2024)      mirtazapine (REMERON) 7.5 MG tablet Take 7.5 mg by mouth (Patient not taking: Reported on 10/21/2024)      simvastatin (ZOCOR) 10 mg tablet Take 1 tablet by mouth every evening (Patient not taking: Reported on 10/21/2024)      traZODone (DESYREL) 50 mg tablet take 1 tablet by mouth every day at night (Patient not taking: Reported on 10/21/2024)       No current facility-administered medications for this visit.     Current Outpatient Medications on File Prior to Visit   Medication Sig    metoprolol succinate (TOPROL-XL) 25 mg 24 hr tablet Take 25 mg by mouth daily    brimonidine tartrate 0.2 % ophthalmic solution INSTILL 1 DROP TWICE A DAY IN THE LEFT EYE (Patient not taking: Reported on 10/21/2024)    donepezil (ARICEPT) 5 mg tablet  (Patient not taking: Reported on 10/21/2024)    Lumigan  0.01 % ophthalmic drops INSTILL 1 DROP INTO LEFT EYE AT BEDTIME (Patient not taking: Reported on 10/21/2024)    mirtazapine (REMERON) 7.5 MG tablet Take 7.5 mg by mouth (Patient not taking: Reported on 10/21/2024)    simvastatin (ZOCOR) 10 mg tablet Take 1 tablet by mouth every evening (Patient not taking: Reported on 10/21/2024)    traZODone (DESYREL) 50 mg tablet take 1 tablet by mouth every day at night (Patient not taking: Reported on 10/21/2024)     No current facility-administered medications on file prior to visit.     She has No Known Allergies..         Objective:    Blood pressure 130/70, pulse 70.    Physical Exam  Vitals reviewed.   HENT:      Head: Normocephalic and atraumatic.      Mouth/Throat:      Mouth: Mucous membranes are moist.   Eyes:      General: Lids are normal.      Extraocular Movements: Extraocular movements intact.      Pupils: Pupils are equal, round, and reactive to light.   Cardiovascular:      Rate and Rhythm: Normal rate.   Pulmonary:      Effort: Pulmonary effort is normal.   Musculoskeletal:         General: Normal range of motion.   Neurological:      Mental Status: She is alert.      Deep Tendon Reflexes:      Reflex Scores:       Tricep reflexes are 1+ on the right side and 1+ on the left side.       Bicep reflexes are 1+ on the right side and 1+ on the left side.       Brachioradialis reflexes are 1+ on the right side and 1+ on the left side.       Patellar reflexes are 1+ on the right side and 1+ on the left side.       Achilles reflexes are 1+ on the right side and 1+ on the left side.  Psychiatric:         Mood and Affect: Mood normal.         Speech: Speech normal.         Neurological Exam  Mental Status  Alert. Oriented only to person. Immediate recall: 0/5. At 5 minutes 0/5. Unable to copy figure. Able to contour and put numbers. Not able to accurately draw hands.. Speech is normal. Language is fluent with no aphasia. Unable to perform serial calculations. and 3  backward.  MOCA 7/30. Gets confused quickly.    Cranial Nerves  CN II: Visual acuity is normal. Visual fields full to confrontation.  CN III, IV, VI: Extraocular movements intact bilaterally. Normal lids and orbits bilaterally. Pupils equal round and reactive to light bilaterally.  CN V: Facial sensation is normal.  CN VII: Full and symmetric facial movement.  CN VIII: Hearing is normal.  CN IX, X: Palate elevates symmetrically. Normal gag reflex.  CN XI: Shoulder shrug strength is normal.  CN XII: Tongue midline without atrophy or fasciculations.    Motor  Normal muscle bulk throughout. No fasciculations present. Normal muscle tone. No abnormal involuntary movements.  Globally 4+/5 strength.    Sensory  Light touch is normal in upper and lower extremities.     Reflexes                                            Right                      Left  Brachioradialis                    1+                         1+  Biceps                                 1+                         1+  Triceps                                1+                         1+  Patellar                                1+                         1+  Achilles                                1+                         1+    Coordination  Right: Finger-to-nose normal.Left: Finger-to-nose normal.    Gait  Casual gait: Slowed gait.    Review of Systems   Constitutional:  Negative for appetite change, fatigue and fever.   HENT: Negative.  Negative for hearing loss, tinnitus, trouble swallowing and voice change.    Eyes: Negative.  Negative for photophobia, pain and visual disturbance.   Respiratory: Negative.  Negative for shortness of breath.    Cardiovascular: Negative.  Negative for palpitations.   Gastrointestinal: Negative.  Negative for nausea and vomiting.   Endocrine: Negative.  Negative for cold intolerance.   Genitourinary: Negative.  Negative for dysuria, frequency and urgency.   Musculoskeletal:  Negative for back pain, gait problem, myalgias,  neck pain and neck stiffness.   Skin: Negative.  Negative for rash.   Allergic/Immunologic: Negative.    Neurological: Negative.  Negative for dizziness, tremors, seizures, syncope, facial asymmetry, speech difficulty, weakness, light-headedness, numbness and headaches.   Hematological: Negative.  Does not bruise/bleed easily.   Psychiatric/Behavioral: Negative.  Negative for confusion, hallucinations and sleep disturbance.        I have spent a total time of 30 minutes in caring for this patient on the day of the visit/encounter including Risks and benefits of tx options, Instructions for management, Patient and family education, Risk factor reductions, Impressions, Documenting in the medical record, Reviewing / ordering tests, medicine, procedures  , and Obtaining or reviewing history  .

## 2024-10-22 ENCOUNTER — OFFICE VISIT (OUTPATIENT)
Facility: CLINIC | Age: 74
End: 2024-10-22
Payer: MEDICARE

## 2024-10-22 DIAGNOSIS — F02.80 ALZHEIMER'S DEMENTIA WITHOUT BEHAVIORAL DISTURBANCE (HCC): Primary | ICD-10-CM

## 2024-10-22 DIAGNOSIS — G30.9 ALZHEIMER'S DEMENTIA WITHOUT BEHAVIORAL DISTURBANCE (HCC): Primary | ICD-10-CM

## 2024-10-22 PROCEDURE — 97530 THERAPEUTIC ACTIVITIES: CPT

## 2024-10-22 PROCEDURE — 97168 OT RE-EVAL EST PLAN CARE: CPT

## 2024-10-22 NOTE — PROGRESS NOTES
OCCUPATIONAL THERAPY RE-EVALUATION        10/22/2024  Arely Wood  1950  [id number]  Divine Polk MD   Diagnosis ICD-10-CM Associated Orders   1. Alzheimer's dementia without behavioral disturbance (HCC)  G30.9     F02.80               Assessment/Plan      SKILLED ANALYSIS:    Pt is a 74 y.o. female referred to Occupational Therapy s/p Alzheimer's dementia without behavioral disturbance (HCC) [G30.9, F02.80].  Pt participated in 30 skilled OT sessions focused on functional cognition and increased engagement in leisure activities for improved cognitive stimulation. Pt demo G progression towards goals. Pt able to sustain attn to task for 45 min, without cues for attn. Pt cont to have difficulty with divided attn, requiring mod/max cues. Pt with mod/max difficulty with auditory and visual processing of 1-2 step directions requiring mod/max verbal and visual cues for task completion. Pt has been consistently using her memory book and habit tracker with cues from her . Pt has been able to identify leisure activities she enjoys, however does require cues for engagement in meaningful leisure activities on a daily basis. Pt still presents with the following deficits: STM, LTM/delayed recall, processing speed, direction following, multitasking/dual tasking, attention, divided attention/alternating attention, and mental manipulation impacting ability to independently and safely complete ADL/IADL and leisure tasks. Pt does demo the need for continued skilled Occupational Therapy services 1-2x/week for 12 weeks with focus on ADL re-training, IADL re-training, fxnl cognition, short term memory, long term memory, fxnl attention, family training/education, and leisure pursuits to address the goals as listed below. Pt in agreement with POC, POC to  25.        Short Term Goals (to be achieved within 4 weeks):  Pt will maintain attention to task for 45 minutes in multimodal environment for improved memory/  immediate recall for increased engagement in IADLs and leisure activities MET  Pt will improve auditory and visual processing to follow 1 step directions with 60% accuracy for increased engagement in IADLs and leisure activities PROGRESSING  Pt will demo G recall of 50% of Written and verbal information utilizing memory strategy of choice for improved STM/delayed memory and improved ability to engage in ADLs/IADLs/ work and leisure activities. PROGRESSING  Pt will identify 3-5 leisure activities that she enjoys, which incorporates cognitive and physical aspects to increase overall health and independence with ADLs/IADLs and leisure activities MET    Long Term Goals (to be achieved within 12 weeks):    Pt will improve auditory and visual processing speed to follow 2 step directions with processing time of <1min and 80% accuracy for improved IADL performance and engagement in leisure activities. PROGRESSING  Pt will demo G carryover of use of internal/external memory strategy aides for improved recall of daily events, improved executive functioning with 70% accuracy PROGRESSING  Pt will consistently engage in weekly leisure activities to allow for optimal cognitive functioning. MET  Pt will be S with HEP, with assistance from  PROGRESSING          SUBJECTIVE      SUBJECTIVE: pt with difficulty with self-expression; does report difficulty with comprehension and execution of tasks     PATIENT GOAL: to improve memory and attention         HISTORY OF PRESENT ILLNESS:     Pt is a 74 y.o. female who was referred to Occupational Therapy s/p  Alzheimer's dementia without behavioral disturbance (HCC) [G30.9, F02.80]. Pt under the care of neurology since 2021 for declining memory, recently sought a second opinion after receiving Alzheimer's dementia diagnosis. Pt referred to OT to maximize cognitive functioning. Pt has also been referred for a  evaluation.       PMH: palpitations, AD, anxiety, insomnia, and  hypercholesteremia.     Past Surgical Hx: History reviewed. No pertinent surgical history.       HOME SETUP/ CLOF: lives with , 2 SH, 2 DIANE, power room on 1st floor; bed/ full bath on 2nd floor; laundry machines on 2nd floor     ADLs: I with all ADLs    IADLs: pt reports doing laundry and cooking; pt's  manages finances; pt manages own medications; uses a pill box   (-) ; stopped driving about 2 months ago; doctor reported to [place], with pt reporting she did surrender her license but is upset and wants to be able to return to driving. A  evaluation was ordered for further assessment of driving abilities and safety.     Functional Mobility: I with functional mobility; reports no difficulties with balance; (-) dizziness     Work: retired RN    Physical activity/ leisure engagement: walks with  2-3x/ week, gardening; inconsistent engagement with word searches, card games, and reading      Pain Levels: none          OBJECTIVE         PHYSICAL ASSESSMENT    R handed     UE ROM LUE AROM  RUE AROM COMMENTS   Shoulder Flex WNL WNL    Shoulder Ext WNL  WNL    Shoulder ABD WNL  WNL    Horizontal ABD WNL  WNL    Horizontal ADD WNL  WNL    Shoulder IR  WNL  WNL    Shoulder ER WNL WNL    Elbow Flex WNL WNL    Elbow Ext  WNL WNL    Supination  WNL WNL    Pronation  WNL WNL    Wrist Flex WNL WNL    Wrist Ext WNL WNL    Finger Flex WNL WNL    Finger Ext WNL WNL    Opposition  WNL WNL                               MMT  LUE RUE   Comments   Shoulder Flex/Ext 5/5 5/5    Shoulder Abd 5/5 5/5    Shoulder ER 5/5 5/5    Shoulder IR 5/5 5/5    Elbow Flex 5/5 5/5    Elbow Ext 5/5 5/5    Wrist Flex 5/5 5/5    Wrist Ext 5/5 5/5    Gross Grasp 5/5 5/5            SENSATION LUE RUE Comments   Sharp Dull  Intact Intact    Proprioception Intact Intact    Pain Intact Intact    Hot/Cold Temp Intact Intact              COGNITIVE ASSESSMENT      Cognitive Assessment:   Memory: Impaired working memory; impaired  STM; requires min/mod cues to utilize strategies; with consistent practice, pt demo F carryover of strategies    Attention: able to sustain attn to task in session for 45 min  Processing: delayed processing; mod/max difficulty answering simple questions; impaired comprehension of 1 step directions  Executive functioning: impaired mental manipulation   Communication: mod difficulty with expression and completing sentences       Compensatory strategies:  Memory Book: pt's  does assist with completion    -habit tracker for nutrition shake, water intake, medication and leisure engagement for cognitive exercise       IADL engagement:    Medication: working on external cueing for visual reminder to consistently use medicated eye drops   Cooking/meal prep: pt with decreased engagement in cooking; discussed meal service options and pre-made food at the grocery stores to decrease cooking demands      Cognitive checklist:  pt reporting difficulty with the following:     Memory: Remembering what people have told you, Remembering peoples' names, Remembering the date/day of the week, and Keeping track of your appointments    Attention: Easily distracted, Dividing your attention (i.e., multi-tasking), and Losing your train of thought    Processing: Processing new information , Following directions, and Responding to questions in a timely manner     Executive Functions: Organizing your thoughts and Planning daily tasks    Communication: Word finding in conversation, Expressing thoughts and ideas fluently, and Expressing thoughts and ideas into writing    Visual: Losing spot on the page when reading            VISUAL ASSESSMENT        Vision Screen       ROM WFL    Tracking WFL    Saccades WFL    Convergence/ Divergence WFL        Today's Session:    Memory Book:  -pt consistently filling out daily tracker, with assistance from   -added word searches to daily tracker for cognitive and language exercises      Medication:   -pt now using medicated eye drops downstairs, takes with  present to ensure consistency; working on changes to routine to incorporate visual cueing for medication reminder.     Word search activity: pt required max cues for task execution, and verbal and visual cues for scanning to locate 2 words in a word search puzzle.   Modified activity for pt to locate specified letters of the alphabet in the word search; completed with min cues; pt to complete for homework           PLANNED THERAPY INTERVENTIONS:  Therapeutic activity  Therapeutic exercise  Neuromuscular re-education   Cognitive re-training   ADL re-training   IADL re-training  Internal and external memory aides  Sustained/alternating/divided attention  Memory and mental manipulation  Auditory processing with immediate recall  Memory retention with immediate and delayed recall         INTERVENTION COMMENTS:  Diagnosis: Alzheimer's dementia without behavioral disturbance (HCC) [G30.9, F02.80]  Precautions: impaired memory   Insurance: Payor: MEDICARE / Plan: MEDICARE A AND B / Product Type: Medicare A & B Fee for Service /   Visit: 30  PN due 12/24/24

## 2024-10-22 NOTE — LETTER
October 22, 2024    Divine Polk MD  [address] [facility] [address]  [place] [id number]    Patient: Arely Wood   YOB: 1950   Date of Visit: 10/22/2024     Encounter Diagnosis     ICD-10-CM    1. Alzheimer's dementia without behavioral disturbance (HCC)  G30.9     F02.80           Dear Dr. Polk:    Thank you for your recent referral of Arely Wood. Please review the attached evaluation summary from Arely's recent visit.     Please verify that you agree with the plan of care by signing the attached order.     If you have any questions or concerns, please do not hesitate to call.     I sincerely appreciate the opportunity to share in the care of one of your patients and hope to have another opportunity to work with you in the near future.     Sincerely,    Yandy Fox, OT      Referring Provider:     I certify that I have read the below Plan of Care and certify the need for these services furnished under this plan of treatment while under my care.                    Divine Polk MD  [address] [place] [id number]  Via Fax: [phone]        OCCUPATIONAL THERAPY RE-EVALUATION        10/22/2024  Arely Wood  1950  [id number]  Divine Polk MD   Diagnosis ICD-10-CM Associated Orders   1. Alzheimer's dementia without behavioral disturbance (HCC)  G30.9     F02.80               Assessment/Plan      SKILLED ANALYSIS:    Pt is a 74 y.o. female referred to Occupational Therapy s/p Alzheimer's dementia without behavioral disturbance (HCC) [G30.9, F02.80].  Pt participated in 30 skilled OT sessions focused on functional cognition and increased engagement in leisure activities for improved cognitive stimulation. Pt demo G progression towards goals. Pt able to sustain attn to task for 45 min, without cues for attn. Pt cont to have difficulty with divided attn, requiring mod/max cues. Pt with mod/max difficulty with auditory and visual processing of 1-2 step directions  requiring mod/max verbal and visual cues for task completion. Pt has been consistently using her memory book and habit tracker with cues from her . Pt has been able to identify leisure activities she enjoys, however does require cues for engagement in meaningful leisure activities on a daily basis. Pt still presents with the following deficits: STM, LTM/delayed recall, processing speed, direction following, multitasking/dual tasking, attention, divided attention/alternating attention, and mental manipulation impacting ability to independently and safely complete ADL/IADL and leisure tasks. Pt does demo the need for continued skilled Occupational Therapy services 1-2x/week for 12 weeks with focus on ADL re-training, IADL re-training, fxnl cognition, short term memory, long term memory, fxnl attention, family training/education, and leisure pursuits to address the goals as listed below. Pt in agreement with POC, POC to  25.        Short Term Goals (to be achieved within 4 weeks):  Pt will maintain attention to task for 45 minutes in multimodal environment for improved memory/ immediate recall for increased engagement in IADLs and leisure activities MET  Pt will improve auditory and visual processing to follow 1 step directions with 60% accuracy for increased engagement in IADLs and leisure activities PROGRESSING  Pt will demo G recall of 50% of Written and verbal information utilizing memory strategy of choice for improved STM/delayed memory and improved ability to engage in ADLs/IADLs/ work and leisure activities. PROGRESSING  Pt will identify 3-5 leisure activities that she enjoys, which incorporates cognitive and physical aspects to increase overall health and independence with ADLs/IADLs and leisure activities MET    Long Term Goals (to be achieved within 12 weeks):    Pt will improve auditory and visual processing speed to follow 2 step directions with processing time of <1min and 80% accuracy  for improved IADL performance and engagement in leisure activities. PROGRESSING  Pt will demo G carryover of use of internal/external memory strategy aides for improved recall of daily events, improved executive functioning with 70% accuracy PROGRESSING  Pt will consistently engage in weekly leisure activities to allow for optimal cognitive functioning. MET  Pt will be S with HEP, with assistance from  PROGRESSING          SUBJECTIVE      SUBJECTIVE: pt with difficulty with self-expression; does report difficulty with comprehension and execution of tasks     PATIENT GOAL: to improve memory and attention         HISTORY OF PRESENT ILLNESS:     Pt is a 74 y.o. female who was referred to Occupational Therapy s/p  Alzheimer's dementia without behavioral disturbance (HCC) [G30.9, F02.80]. Pt under the care of neurology since 2021 for declining memory, recently sought a second opinion after receiving Alzheimer's dementia diagnosis. Pt referred to OT to maximize cognitive functioning. Pt has also been referred for a  evaluation.       PMH: palpitations, AD, anxiety, insomnia, and hypercholesteremia.     Past Surgical Hx: History reviewed. No pertinent surgical history.       HOME SETUP/ CLOF: lives with , 2 SH, 2 DIANE, power room on 1st floor; bed/ full bath on 2nd floor; laundry machines on 2nd floor     ADLs: I with all ADLs    IADLs: pt reports doing laundry and cooking; pt's  manages finances; pt manages own medications; uses a pill box   (-) ; stopped driving about 2 months ago; doctor reported to [place], with pt reporting she did surrender her license but is upset and wants to be able to return to driving. A  evaluation was ordered for further assessment of driving abilities and safety.     Functional Mobility: I with functional mobility; reports no difficulties with balance; (-) dizziness     Work: retired RN    Physical activity/ leisure engagement: walks with  2-3x/  week, gardening; inconsistent engagement with word searches, card games, and reading      Pain Levels: none          OBJECTIVE         PHYSICAL ASSESSMENT    R handed     UE ROM LUE AROM  RUE AROM COMMENTS   Shoulder Flex WNL WNL    Shoulder Ext WNL  WNL    Shoulder ABD WNL  WNL    Horizontal ABD WNL  WNL    Horizontal ADD WNL  WNL    Shoulder IR  WNL  WNL    Shoulder ER WNL WNL    Elbow Flex WNL WNL    Elbow Ext  WNL WNL    Supination  WNL WNL    Pronation  WNL WNL    Wrist Flex WNL WNL    Wrist Ext WNL WNL    Finger Flex WNL WNL    Finger Ext WNL WNL    Opposition  WNL WNL                               MMT  LUE RUE   Comments   Shoulder Flex/Ext 5/5 5/5    Shoulder Abd 5/5 5/5    Shoulder ER 5/5 5/5    Shoulder IR 5/5 5/5    Elbow Flex 5/5 5/5    Elbow Ext 5/5 5/5    Wrist Flex 5/5 5/5    Wrist Ext 5/5 5/5    Gross Grasp 5/5 5/5            SENSATION LUE RUE Comments   Sharp Dull  Intact Intact    Proprioception Intact Intact    Pain Intact Intact    Hot/Cold Temp Intact Intact              COGNITIVE ASSESSMENT      Cognitive Assessment:   Memory: Impaired working memory; impaired STM; requires min/mod cues to utilize strategies; with consistent practice, pt demo F carryover of strategies    Attention: able to sustain attn to task in session for 45 min  Processing: delayed processing; mod/max difficulty answering simple questions; impaired comprehension of 1 step directions  Executive functioning: impaired mental manipulation   Communication: mod difficulty with expression and completing sentences       Compensatory strategies:  Memory Book: pt's  does assist with completion    -habit tracker for nutrition shake, water intake, medication and leisure engagement for cognitive exercise       IADL engagement:    Medication: working on external cueing for visual reminder to consistently use medicated eye drops   Cooking/meal prep: pt with decreased engagement in cooking; discussed meal service options and  pre-made food at the grocery stores to decrease cooking demands      Cognitive checklist:  pt reporting difficulty with the following:     Memory: Remembering what people have told you, Remembering peoples' names, Remembering the date/day of the week, and Keeping track of your appointments    Attention: Easily distracted, Dividing your attention (i.e., multi-tasking), and Losing your train of thought    Processing: Processing new information , Following directions, and Responding to questions in a timely manner     Executive Functions: Organizing your thoughts and Planning daily tasks    Communication: Word finding in conversation, Expressing thoughts and ideas fluently, and Expressing thoughts and ideas into writing    Visual: Losing spot on the page when reading            VISUAL ASSESSMENT        Vision Screen       ROM WFL    Tracking WFL    Saccades WFL    Convergence/ Divergence WFL        Today's Session:    Memory Book:  -pt consistently filling out daily tracker, with assistance from   -added word searches to daily tracker for cognitive and language exercises     Medication:   -pt now using medicated eye drops downstairs, takes with  present to ensure consistency; working on changes to routine to incorporate visual cueing for medication reminder.     Word search activity: pt required max cues for task execution, and verbal and visual cues for scanning to locate 2 words in a word search puzzle.   Modified activity for pt to locate specified letters of the alphabet in the word search; completed with min cues; pt to complete for homework           PLANNED THERAPY INTERVENTIONS:  Therapeutic activity  Therapeutic exercise  Neuromuscular re-education   Cognitive re-training   ADL re-training   IADL re-training  Internal and external memory aides  Sustained/alternating/divided attention  Memory and mental manipulation  Auditory processing with immediate recall  Memory retention with immediate and  delayed recall         INTERVENTION COMMENTS:  Diagnosis: Alzheimer's dementia without behavioral disturbance (HCC) [G30.9, F02.80]  Precautions: impaired memory   Insurance: Payor: MEDICARE / Plan: MEDICARE A AND B / Product Type: Medicare A & B Fee for Service /   Visit: 30  PN due 12/24/24

## 2024-10-29 ENCOUNTER — OFFICE VISIT (OUTPATIENT)
Facility: CLINIC | Age: 74
End: 2024-10-29
Payer: MEDICARE

## 2024-10-29 DIAGNOSIS — G30.9 ALZHEIMER'S DEMENTIA WITHOUT BEHAVIORAL DISTURBANCE (HCC): Primary | ICD-10-CM

## 2024-10-29 DIAGNOSIS — F02.80 ALZHEIMER'S DEMENTIA WITHOUT BEHAVIORAL DISTURBANCE (HCC): Primary | ICD-10-CM

## 2024-10-29 PROCEDURE — 97530 THERAPEUTIC ACTIVITIES: CPT

## 2024-10-29 NOTE — PROGRESS NOTES
Occupational Therapy Daily Note:    Today's date: 10/29/2024  Patient name: Arely Wood  : 1950  MRN: [id number]  Referring provider: Divine Polk MD  Dx:   Encounter Diagnosis   Name Primary?    Alzheimer's dementia without behavioral disturbance (HCC) Yes         Subjective: no new complaints       Objective: Pt engaged in skilled OT treatment session with focus on fxnl cognition, short term memory, long term memory, and fxnl attention to increase engagement, endurance, tolerance, and independence with daily ADL and IADL tasks.         PT Code Minutes                                           Task Details        Therapeutic Activity  Memory Book:  -pt consistently filling out daily tracker, with assistance from   -provided with Nov habit tracker     Medication:   -pt's  giving verbal reminders to use medicated eye drops to ensure consistency         Cognitive re-training:  -modified word search activity, using word grid to locate specified letters; required mod verbal and visual cues to complete.     -simple word search locating common names of men; required min/mod verbal and visual cues; issued as homework to complete       Next session:   -expand HEP to include activities to increase communication and socialization   -follow-up on change in eye drop routine           Neuro Re-Ed                 Therapeutic Exercise               Manual          HEP  Continue to log habit tracker at home to improve carryover of cognitively stimulating tasks as leisure activities within the home environment.      : Initiated memory book     : Engagement in leisure activity to increase cognitive stimulation:   -engagement in at least 1 activity daily and document in activity book   -habit tracker for leisure engagement                 Assessment: Tolerated treatment well. Pt with G engagement in session; able to complete cognitive tasks with mod cues for completion and increased time. Patient  would benefit from continued skilled OT.      Plan: Continued skilled OT per POC    INTERVENTION COMMENTS:  Diagnosis: Alzheimer's dementia without behavioral disturbance (HCC) [G30.9, F02.80]  Precautions: impaired memory   Insurance: Payor: MEDICARE / Plan: MEDICARE A AND B / Product Type: Medicare A & B Fee for Service /   Visit: 31  PN: 10/1/24

## 2024-11-08 ENCOUNTER — OFFICE VISIT (OUTPATIENT)
Facility: CLINIC | Age: 74
End: 2024-11-08
Payer: MEDICARE

## 2024-11-08 DIAGNOSIS — G30.9 ALZHEIMER'S DEMENTIA WITHOUT BEHAVIORAL DISTURBANCE (HCC): Primary | ICD-10-CM

## 2024-11-08 DIAGNOSIS — F02.80 ALZHEIMER'S DEMENTIA WITHOUT BEHAVIORAL DISTURBANCE (HCC): Primary | ICD-10-CM

## 2024-11-08 PROCEDURE — 97530 THERAPEUTIC ACTIVITIES: CPT

## 2024-11-08 NOTE — PROGRESS NOTES
Occupational Therapy Daily Note:    Today's date: 2024  Patient name: Arely Wood  : 1950  MRN: [id number]  Referring provider: Divine Polk MD  Dx:   Encounter Diagnosis   Name Primary?    Alzheimer's dementia without behavioral disturbance (HCC) Yes         Subjective: no new complaints       Objective: Pt engaged in skilled OT treatment session with focus on fxnl cognition, short term memory, long term memory, and fxnl attention to increase engagement, endurance, tolerance, and independence with daily ADL and IADL tasks.         PT Code Minutes                                           Task Details        Therapeutic Activity  Memory Book:  -pt consistently filling out daily tracker, with assistance from   -pt consistent with nutritional shake daily, increasing water intake, and  gives verbal reminders to use medicated eye drops to ensure consistency         Cognitive re-training:  Cognitive scavenger hunt of locating 4 staff members and asking them their middle names.   -Reviewed staff names and staff locations in clinic multiple times prior to pt finding staff members; pt then able to locate staff with min cueing  -pt required mod cues to recall what question to ask of the staff; pt was then able to read question to staff; min cues to write down answers          Homework:   -simple word search locating names of birds  -modified letter search in a word search grid             Neuro Re-Ed                 Therapeutic Exercise               Manual          HEP  Continue to log habit tracker at home to improve carryover of cognitively stimulating tasks as leisure activities within the home environment.      : Initiated memory book     : Engagement in leisure activity to increase cognitive stimulation:   -engagement in at least 1 activity daily and document in activity book   -habit tracker for leisure engagement                 Assessment: Tolerated treatment well. Pt with NESSA  engagement in session; pt with improved recall with repetition of information; requires increased time for processing. Pt's  reporting pt with increased difficulty signing her name. Will address at next session. Patient would benefit from continued skilled OT.      Plan: Continued skilled OT per POC    INTERVENTION COMMENTS:  Diagnosis: Alzheimer's dementia without behavioral disturbance (HCC) [G30.9, F02.80]  Precautions: impaired memory   Insurance: Payor: MEDICARE / Plan: MEDICARE A AND B / Product Type: Medicare A & B Fee for Service /   Visit: 32  PN: 12/24/24

## 2024-11-15 ENCOUNTER — OFFICE VISIT (OUTPATIENT)
Facility: CLINIC | Age: 74
End: 2024-11-15
Payer: MEDICARE

## 2024-11-15 DIAGNOSIS — G30.9 ALZHEIMER'S DEMENTIA WITHOUT BEHAVIORAL DISTURBANCE (HCC): Primary | ICD-10-CM

## 2024-11-15 DIAGNOSIS — F02.80 ALZHEIMER'S DEMENTIA WITHOUT BEHAVIORAL DISTURBANCE (HCC): Primary | ICD-10-CM

## 2024-11-15 PROCEDURE — 97530 THERAPEUTIC ACTIVITIES: CPT

## 2024-11-15 NOTE — PROGRESS NOTES
Occupational Therapy Daily Note:    Today's date: 11/15/2024  Patient name: Arely Wood  : 1950  MRN: [id number]  Referring provider: Divine Polk MD  Dx:   Encounter Diagnosis   Name Primary?    Alzheimer's dementia without behavioral disturbance (HCC) Yes       Subjective: no new complaints       Objective: Pt engaged in skilled OT treatment session with focus on fxnl cognition, short term memory, long term memory, and fxnl attention to increase engagement, endurance, tolerance, and independence with daily ADL and IADL tasks.         PT Code Minutes                                           Task Details        Therapeutic Activity  Memory Book:  -pt consistently filling out daily tracker, with assistance from   -pt consistent with nutritional shake daily, increasing water intake, and  gives verbal reminders to use medicated eye drops to ensure consistency         Cognitive re-training:  -pt with recent difficulty with signing her name. Practiced pt being able to state her full name and then write her full name. Pt I with first and last name, requires cues for recall of middle name. Pt able to sign her name multiple times, with min cueing. Pt with increased ability to sign name when copying from her prior signature rather than spontaneously signing her name. Pt able to sign the bottom of a check with mod/max cueing for identifying where to put her signature and min cues for task initiation of signing her name. Pt able to identify where to sign her name on the back of the check, with min cues for initiation of task.   -pt able to independently write her 's first and last name; max cues to recall his middle name  -Began a reference sheet with pt's family members; pt able to write her name and her 's names with min cues; pt able to recall her 3 children's names with min/mod cueing. Pt tasked to complete worksheet by next session.          Homework:   -Fill out names of children  and grandchildren on worksheet             Neuro Re-Ed                 Therapeutic Exercise               Manual          HEP  Continue to log habit tracker at home to improve carryover of cognitively stimulating tasks as leisure activities within the home environment.      6/20: Initiated memory book     7/17: Engagement in leisure activity to increase cognitive stimulation:   -engagement in at least 1 activity daily and document in activity book   -habit tracker for leisure engagement                 Assessment: Tolerated treatment well. Pt with G engagement in session; pt with improved ability to sign her name with repetition; mod difficulty with recall of family members names; will cont to address and add reference sheet into memory binder. Patient would benefit from continued skilled OT.      Plan: Continued skilled OT per POC    INTERVENTION COMMENTS:  Diagnosis: Alzheimer's dementia without behavioral disturbance (HCC) [G30.9, F02.80]  Precautions: impaired memory   Insurance: Payor: MEDICARE / Plan: MEDICARE A AND B / Product Type: Medicare A & B Fee for Service /   Visit: 33  PN: 12/24/24

## 2024-11-22 ENCOUNTER — OFFICE VISIT (OUTPATIENT)
Facility: CLINIC | Age: 74
End: 2024-11-22
Payer: MEDICARE

## 2024-11-22 DIAGNOSIS — G30.9 ALZHEIMER'S DEMENTIA WITHOUT BEHAVIORAL DISTURBANCE (HCC): Primary | ICD-10-CM

## 2024-11-22 DIAGNOSIS — F02.80 ALZHEIMER'S DEMENTIA WITHOUT BEHAVIORAL DISTURBANCE (HCC): Primary | ICD-10-CM

## 2024-11-22 PROCEDURE — 97530 THERAPEUTIC ACTIVITIES: CPT

## 2024-11-22 NOTE — PROGRESS NOTES
Occupational Therapy Daily Note:    Today's date: 2024  Patient name: Arely Wood  : 1950  MRN: [id number]  Referring provider: Divine Polk MD  Dx:   Encounter Diagnosis   Name Primary?    Alzheimer's dementia without behavioral disturbance (HCC) Yes       Subjective: no new complaints       Objective: Pt engaged in skilled OT treatment session with focus on fxnl cognition, short term memory, long term memory, and fxnl attention to increase engagement, endurance, tolerance, and independence with daily ADL and IADL tasks.         PT Code Minutes                                           Task Details        Therapeutic Activity  Memory Book:  -pt consistently filling out daily tracker, with assistance from   -pt consistent with nutritional shake daily, increasing water intake, and  gives verbal reminders to use medicated eye drops to ensure consistency         Cognitive re-training:  -Signing name: Carryover from last session, as pt with recent difficulty signing name. pt able to sign her first and last name with min cueing.     -Development of family tree: pt completed homework of listing all the names of her children and grandchildren. Focus of session was in transitioning information into a family tree. Pt required max cues and max increased time to correctly identify family member's names and fill out family tree.                    Neuro Re-Ed                 Therapeutic Exercise               Manual          HEP  Continue to log habit tracker at home to improve carryover of cognitively stimulating tasks as leisure activities within the home environment.      : Initiated memory book     : Engagement in leisure activity to increase cognitive stimulation:   -engagement in at least 1 activity daily and document in activity book   -habit tracker for leisure engagement                 Assessment: Tolerated treatment well. Pt I with spontaneously recalling her 's name;  min/mod cues and increased time to recall 's name when asked; max cues for recall of all family member's names. Improved recall and speed of recall with practice. Family tree was made and added into memory binder. Will cont to address.         Plan: Continued skilled OT per POC    INTERVENTION COMMENTS:  Diagnosis: Alzheimer's dementia without behavioral disturbance (HCC) [G30.9, F02.80]  Precautions: impaired memory   Insurance: Payor: MEDICARE / Plan: MEDICARE A AND B / Product Type: Medicare A & B Fee for Service /   Visit: 34  PN: 12/24/24, due on 10th visit, currently on 5/10

## 2024-11-25 ENCOUNTER — OFFICE VISIT (OUTPATIENT)
Facility: CLINIC | Age: 74
End: 2024-11-25
Payer: MEDICARE

## 2024-11-25 DIAGNOSIS — G30.9 ALZHEIMER'S DEMENTIA WITHOUT BEHAVIORAL DISTURBANCE (HCC): Primary | ICD-10-CM

## 2024-11-25 DIAGNOSIS — F02.80 ALZHEIMER'S DEMENTIA WITHOUT BEHAVIORAL DISTURBANCE (HCC): Primary | ICD-10-CM

## 2024-11-25 PROCEDURE — 97530 THERAPEUTIC ACTIVITIES: CPT

## 2024-11-25 NOTE — PROGRESS NOTES
Occupational Therapy Daily Note:    Today's date: 2024  Patient name: Arely Wood  : 1950  MRN: [id number]  Referring provider: Divine Polk MD  Dx:   Encounter Diagnosis   Name Primary?    Alzheimer's dementia without behavioral disturbance (HCC) Yes       Subjective: no new complaints       Objective: Pt engaged in skilled OT treatment session with focus on fxnl cognition, short term memory, long term memory, and fxnl attention to increase engagement, endurance, tolerance, and independence with daily ADL and IADL tasks.         PT Code Minutes                                           Task Details        Therapeutic Activity  Cognitive re-training:  -Signing name: pt able to sign own name with min cueing.  -Word recall with family recognition: pt able to state 's name with min increased time and cueing. Pt required max cues and use of family tree to recall names of children and grandchildren.   -Word recall/ memory with address: pt unable to state her address; when provided with a choice of 3, pt able to correctly identify her house number, street name, town and state. Unable to correctly identify zip code.   -Had pt write her address on an envelope; required max cues and increased time to complete  -verbal review of address: max cues to correctly state address, with use of visual and verbal cues.                       Neuro Re-Ed                 Therapeutic Exercise               Manual          HEP  Continue to log habit tracker at home to improve carryover of cognitively stimulating tasks as leisure activities within the home environment.      : Initiated memory book     : Engagement in leisure activity to increase cognitive stimulation:   -engagement in at least 1 activity daily and document in activity book   -habit tracker for leisure engagement                 Assessment: Tolerated treatment well. Pt with G carryover of being able to sign own name; cont to require visual  and verbal cues to recall family member's names. Pt to bring pictures of her children and grandchildren to next session to add to memory book. Will cont to address.       Plan: Continued skilled OT per POC    INTERVENTION COMMENTS:  Diagnosis: Alzheimer's dementia without behavioral disturbance (HCC) [G30.9, F02.80]  Precautions: impaired memory   Insurance: Payor: MEDICARE / Plan: MEDICARE A AND B / Product Type: Medicare A & B Fee for Service /   Visit: 35  PN: 12/24/24, due on 10th visit, currently on 6/10

## 2024-12-05 ENCOUNTER — OFFICE VISIT (OUTPATIENT)
Facility: CLINIC | Age: 74
End: 2024-12-05
Payer: MEDICARE

## 2024-12-05 DIAGNOSIS — G30.9 ALZHEIMER DEMENTIA WITHOUT BEHAVIORAL DISTURBANCE (HCC): Primary | ICD-10-CM

## 2024-12-05 DIAGNOSIS — F02.80 ALZHEIMER DEMENTIA WITHOUT BEHAVIORAL DISTURBANCE (HCC): Primary | ICD-10-CM

## 2024-12-05 PROCEDURE — 97530 THERAPEUTIC ACTIVITIES: CPT

## 2024-12-05 NOTE — PROGRESS NOTES
"Occupational Therapy Daily Note:    Today's date: 2024  Patient name: Arely Wood  : 1950  MRN: [id number]  Referring provider: Divine Polk MD  Dx:   Encounter Diagnosis   Name Primary?    Alzheimer dementia without behavioral disturbance (HCC) Yes       Subjective: \"Today's not a good day.\"       Objective: Pt engaged in skilled OT treatment session with focus on fxnl cognition, short term memory, long term memory, and fxnl attention to increase engagement, endurance, tolerance, and independence with daily ADL and IADL tasks.         PT Code Minutes                                           Task Details        Therapeutic Activity  Cognitive re-training:  -Signing name: pt able to sign own name with mod cueing.  -Word recall with family recognition: pt able to state 's name with mod increased time and cueing. Pt required max cues and use of family tree to recall names of children and grandchildren.   -Word recall/ memory with address: pt unable to state her address; when provided with a choice of 3, pt able to correctly identify her house number, street name, town, state and zip code.   -Had pt write her address on an envelope; required max cues and increased time to complete  - memory activity finding matching cards on tabletop; no difficulty with 5 sets, min difficulty with 10 sets.                      Neuro Re-Ed                 Therapeutic Exercise               Manual          HEP  Continue to log habit tracker at home to improve carryover of cognitively stimulating tasks as leisure activities within the home environment.      : Initiated memory book     : Engagement in leisure activity to increase cognitive stimulation:   -engagement in at least 1 activity daily and document in activity book   -habit tracker for leisure engagement                 Assessment: Tolerated treatment well. Cont to require visual and verbal cues to recall family member's names.  reports " struggling to get photos off his phone to print for memory book, will bring phone next session to print photos. Will cont to address.       Plan: Continued skilled OT per POC    INTERVENTION COMMENTS:  Diagnosis: Alzheimer's dementia without behavioral disturbance (HCC) [G30.9, F02.80]  Precautions: impaired memory   Insurance: Payor: MEDICARE / Plan: MEDICARE A AND B / Product Type: Medicare A & B Fee for Service /   Visit: 35  PN: 12/24/24, due on 10th visit, currently on 7/10

## 2024-12-10 ENCOUNTER — OFFICE VISIT (OUTPATIENT)
Facility: CLINIC | Age: 74
End: 2024-12-10
Payer: MEDICARE

## 2024-12-10 DIAGNOSIS — G30.9 ALZHEIMER DEMENTIA WITHOUT BEHAVIORAL DISTURBANCE (HCC): Primary | ICD-10-CM

## 2024-12-10 DIAGNOSIS — F02.80 ALZHEIMER DEMENTIA WITHOUT BEHAVIORAL DISTURBANCE (HCC): Primary | ICD-10-CM

## 2024-12-10 PROCEDURE — 97530 THERAPEUTIC ACTIVITIES: CPT

## 2024-12-10 NOTE — PROGRESS NOTES
Occupational Therapy Daily Note:    Today's date: 12/10/2024  Patient name: Arely Wood  : 1950  MRN: [id number]  Referring provider: Divine Polk MD  Dx:   Encounter Diagnosis   Name Primary?    Alzheimer dementia without behavioral disturbance (HCC) Yes         Subjective: no complaints      Objective: Pt engaged in skilled OT treatment session with focus on fxnl cognition, short term memory, long term memory, and fxnl attention to increase engagement, endurance, tolerance, and independence with daily ADL and IADL tasks.         PT Code Minutes                                           Task Details        Therapeutic Activity  Cognitive re-training:  -Signing name: pt able to sign own name with min cueing on simulated check  -Word recall with family recognition: pt able to state 's name with mod increased time and cueing. Pt required max cues and use of family tree to recall names of children and grandchildren.  provided photos of family members and new family tree was made.  -Word recall/ memory with address: pt unable to state her address; when provided with a choice of 3, pt able to correctly identify her house number, street name, town, state and zip code.   -Had pt write her address on an simulated envelope; required mod cues and increased time to complete                      Neuro Re-Ed                 Therapeutic Exercise               Manual          HEP  Continue to log habit tracker at home to improve carryover of cognitively stimulating tasks as leisure activities within the home environment.      : Initiated memory book     : Engagement in leisure activity to increase cognitive stimulation:   -engagement in at least 1 activity daily and document in activity book   -habit tracker for leisure engagement                 Assessment: Tolerated treatment well. Cont to require visual and verbal cues to recall family member's names. Able to start family tree with photos of  family.  to work on getting remainder of family members photos. Will cont to address.       Plan: Continued skilled OT per POC    INTERVENTION COMMENTS:  Diagnosis: Alzheimer's dementia without behavioral disturbance (HCC) [G30.9, F02.80]  Precautions: impaired memory   Insurance: Payor: MEDICARE / Plan: MEDICARE A AND B / Product Type: Medicare A & B Fee for Service /   Visit: 36   Currently 8/10 (PN due 10th visit - 12/24/24)

## 2024-12-17 ENCOUNTER — APPOINTMENT (OUTPATIENT)
Facility: CLINIC | Age: 74
End: 2024-12-17
Payer: MEDICARE

## 2024-12-31 ENCOUNTER — OFFICE VISIT (OUTPATIENT)
Facility: CLINIC | Age: 74
End: 2024-12-31
Payer: MEDICARE

## 2024-12-31 DIAGNOSIS — F02.80 ALZHEIMER DEMENTIA WITHOUT BEHAVIORAL DISTURBANCE (HCC): Primary | ICD-10-CM

## 2024-12-31 DIAGNOSIS — G30.9 ALZHEIMER DEMENTIA WITHOUT BEHAVIORAL DISTURBANCE (HCC): Primary | ICD-10-CM

## 2024-12-31 PROCEDURE — 97168 OT RE-EVAL EST PLAN CARE: CPT

## 2024-12-31 PROCEDURE — 97530 THERAPEUTIC ACTIVITIES: CPT

## 2024-12-31 NOTE — PROGRESS NOTES
OCCUPATIONAL THERAPY RE-EVALUATION        12/31/24  Arely Wood  1950  [id number]  Divine Polk MD   Diagnosis ICD-10-CM Associated Orders   1. Alzheimer dementia without behavioral disturbance (HCC)  G30.9     F02.80               Assessment/Plan      SKILLED ANALYSIS:    Pt is a 74 y.o. female referred to Occupational Therapy s/p Alzheimer dementia without behavioral disturbance (HCC) [G30.9, F02.80].  Pt participated in 38 skilled OT sessions focused on functional cognition and increased engagement in leisure activities for improved cognitive stimulation. Pt has been able to identify leisure activities she enjoys, however does require cues for engagement in meaningful leisure activities on a daily basis. Pt with G sustained attn to task, able to attend to task for entire OT session without distraction. Pt does have max difficulty with divided attn, requiring mod/max cues. Pt with mod/max difficulty with auditory and visual processing of 1-2 step directions requiring mod/max verbal and visual cues for task completion. A memory book has been established, with a daily habit tracker and memory log. Pt requires cues from her  to consistently complete and use memory book. A family tree has been developed to aid in recall of family members names and relationships. Pt currently requires max verbal and visual cues to correctly identify her family. Pt still presents with the following deficits: STM, LTM/delayed recall, processing speed, direction following, multitasking/dual tasking, attention, divided attention/alternating attention, and mental manipulation impacting ability to independently and safely complete ADL/IADL and leisure tasks. Pt does demo the need for continued skilled Occupational Therapy services 1-2x/week for 12 weeks with focus on ADL re-training, IADL re-training, fxnl cognition, short term memory, long term memory, fxnl attention, family training/education, and leisure pursuits to  address the goals as listed below. Pt in agreement with POC, POC to  3/25/25.        Short Term Goals (to be achieved within 4 weeks):  Pt will maintain attention to task for 45 minutes in multimodal environment for improved memory/ immediate recall for increased engagement in IADLs and leisure activities MET  Pt will improve auditory and visual processing to follow 1 step directions with 60% accuracy for increased engagement in IADLs and leisure activities PROGRESSING  Pt will demo G recall of 50% of Written and verbal information utilizing memory strategy of choice for improved STM/delayed memory and improved ability to engage in ADLs/IADLs/ work and leisure activities. PROGRESSING  Pt will identify 3-5 leisure activities that she enjoys, which incorporates cognitive and physical aspects to increase overall health and independence with ADLs/IADLs and leisure activities MET  Pt will utilize memory book to recall family member names and relationships with min cueing NEW GOAL    Long Term Goals (to be achieved within 12 weeks):    Pt will improve auditory and visual processing speed to follow 2 step directions with processing time of <1min and 80% accuracy for improved IADL performance and engagement in leisure activities. D/C GOAL  Pt will demo ability to follow basic 1 step commands for engagement in leisure activities and completion of IADLs. NEW GOAL  Pt will demo G carryover of use of internal/external memory strategy aides for improved recall of daily events, improved executive functioning with 70% accuracy PROGRESSING  Pt will consistently engage in weekly leisure activities to allow for optimal cognitive functioning. ONGOING    Pt will be S with HEP, with assistance from  ONGOING   Complete pt/ family education on safety strategies, including home modifications to ensure pt safety with declining memory NEW GOAL          SUBJECTIVE      SUBJECTIVE: pt with decreased ability to state her full name;  difficulty with expression     PATIENT GOAL: to improve memory and attention         HISTORY OF PRESENT ILLNESS:     Pt is a 74 y.o. female who was referred to Occupational Therapy s/p  Alzheimer dementia without behavioral disturbance (HCC) [G30.9, F02.80]. Pt under the care of neurology since 2021 for declining memory, recently sought a second opinion after receiving Alzheimer's dementia diagnosis. Pt referred to OT to maximize cognitive functioning. Pt has also been referred for a  evaluation.       PMH: palpitations, AD, anxiety, insomnia, and hypercholesteremia.     Past Surgical Hx: History reviewed. No pertinent surgical history.       HOME SETUP/ CLOF: lives with , 2 SH, 2 DIANE, power room on 1st floor; bed/ full bath on 2nd floor; laundry machines on 2nd floor     ADLs: I with all ADLs    IADLs: pt reports doing laundry and cooking; pt's  manages finances; pt manages own medications; uses a pill box   (-) ; stopped driving about 2 months ago; doctor reported to [place], with pt reporting she did surrender her license but is upset and wants to be able to return to driving. A  evaluation was ordered for further assessment of driving abilities and safety.     Functional Mobility: I with functional mobility; reports no difficulties with balance; (-) dizziness     Work: retired RN    Physical activity/ leisure engagement: walks with  2-3x/ week, gardening; inconsistent engagement with word searches, card games, and reading      Pain Levels: none          OBJECTIVE         PHYSICAL ASSESSMENT    R handed     UE ROM LUE AROM  RUE AROM COMMENTS   Shoulder Flex WNL WNL    Shoulder Ext WNL  WNL    Shoulder ABD WNL  WNL    Horizontal ABD WNL  WNL    Horizontal ADD WNL  WNL    Shoulder IR  WNL  WNL    Shoulder ER WNL WNL    Elbow Flex WNL WNL    Elbow Ext  WNL WNL    Supination  WNL WNL    Pronation  WNL WNL    Wrist Flex WNL WNL    Wrist Ext WNL WNL    Finger Flex WNL WNL     Finger Ext WNL WNL    Opposition  WNL WNL                               MMT  LUE RUE   Comments   Shoulder Flex/Ext 5/5 5/5    Shoulder Abd 5/5 5/5    Shoulder ER 5/5 5/5    Shoulder IR 5/5 5/5    Elbow Flex 5/5 5/5    Elbow Ext 5/5 5/5    Wrist Flex 5/5 5/5    Wrist Ext 5/5 5/5    Gross Grasp 5/5 5/5            SENSATION LUE RUE Comments   Sharp Dull  Intact Intact    Proprioception Intact Intact    Pain Intact Intact    Hot/Cold Temp Intact Intact              COGNITIVE ASSESSMENT      Cognitive Assessment:   Memory: Impaired working memory; impaired STM/LTM; requires mod/max cueing to recall daily and past events; max cues to recall her address, family members names and relationships; pt demo F carryover of strategies    Attention: able to sustain attn to task in session for 45 min; max difficulty with divided attn  Processing: delayed processing; mod/max difficulty answering simple questions; impaired comprehension of 1 step directions  Executive functioning: impaired mental manipulation   Communication: mod/max difficulty with expression and completing sentences       Compensatory strategies:  Memory Book: pt's  does assist with completion    -habit tracker for nutrition shake, water intake, medication and leisure engagement for cognitive exercise    -Family Tree, including pictures of immediate family and grandchildren: requires max cueing to correctly identify family members, even with use of pictures      IADL engagement:    Medication:  provides supervision to ensure consistency with use of eye drops   Cooking/meal prep: pt with decreased engagement in cooking; discussed meal service options and pre-made food at the grocery stores to decrease cooking demands    HEP:  Increase engagement in leisure activities to increase verbalizations/ communication/ socialization and cognitive engagement       Cognitive checklist:  pt reporting difficulty with the following:     Memory: Remembering what  people have told you, Remembering peoples' names, Remembering the date/day of the week, and Keeping track of your appointments    Attention: Easily distracted, Dividing your attention (i.e., multi-tasking), and Losing your train of thought    Processing: Processing new information , Following directions, and Responding to questions in a timely manner     Executive Functions: Organizing your thoughts and Planning daily tasks    Communication: Word finding in conversation, Expressing thoughts and ideas fluently, and Expressing thoughts and ideas into writing    Visual: Losing spot on the page when reading            VISUAL ASSESSMENT        Vision Screen     ROM WFL   Tracking WFL   Saccades WFL   Convergence/ Divergence WFL       Today's Session:    Cognitive re-training:    Memory Book:  -Family Tree: pt required max cues to correctly identify family members, even with use of pictures and printed names by pictures     Compensatory Strategies:  -Recommended identification bracelet, with pt's name and contact number, as pt with difficulty recalling own name, unable to recall address and has difficulty identifying her  by relationship. Pt's  reporting that pt did attempt to follow a stranger out of [facility], mistaking him for her .     Nutrition:  -Recommended use of Nuun tablets or Liquid IV to flavor water and as added hydration, as pt with decreased water intake.             PLANNED THERAPY INTERVENTIONS:  Therapeutic activity  Therapeutic exercise  Neuromuscular re-education   Cognitive re-training   ADL re-training   IADL re-training  Internal and external memory aides  Sustained/alternating/divided attention  Memory and mental manipulation  Auditory processing with immediate recall  Memory retention with immediate and delayed recall         INTERVENTION COMMENTS:  Diagnosis: Alzheimer dementia without behavioral disturbance (HCC) [G30.9, F02.80]  Precautions: impaired memory   Insurance: Payor:  MEDICARE / Plan: MEDICARE A AND B / Product Type: Medicare A & B Fee for Service /   Visit: 38  PN due on 10th visit, currently on 1/10            Auth Tracker  DATE 12/31        Visit # 38, 1/10

## 2024-12-31 NOTE — LETTER
January 2, 2025    Divine Polk MD  [address] [address]    Patient: Arely Wood   YOB: 1950   Date of Visit: 12/31/2024     Encounter Diagnosis     ICD-10-CM    1. Alzheimer dementia without behavioral disturbance (HCC)  G30.9     F02.80           Dear Dr. Polk:    Thank you for your recent referral of Arely Wood. Please review the attached evaluation summary from Arely's recent visit.     Please verify that you agree with the plan of care by signing the attached order.     If you have any questions or concerns, please do not hesitate to call.     I sincerely appreciate the opportunity to share in the care of one of your patients and hope to have another opportunity to work with you in the near future.     Sincerely,    Yandy Fox, OT      Referring Provider:     I certify that I have read the below Plan of Care and certify the need for these services furnished under this plan of treatment while under my care.                    Divine Polk MD  [address] [address]  Via Fax: [phone]        OCCUPATIONAL THERAPY RE-EVALUATION        12/31/24  Arely Wood  1950  [id number]  Divine Polk MD   Diagnosis ICD-10-CM Associated Orders   1. Alzheimer dementia without behavioral disturbance (HCC)  G30.9     F02.80               Assessment/Plan      SKILLED ANALYSIS:    Pt is a 74 y.o. female referred to Occupational Therapy s/p Alzheimer dementia without behavioral disturbance (HCC) [G30.9, F02.80].  Pt participated in 38 skilled OT sessions focused on functional cognition and increased engagement in leisure activities for improved cognitive stimulation. Pt has been able to identify leisure activities she enjoys, however does require cues for engagement in meaningful leisure activities on a daily basis. Pt with G sustained attn to task, able to attend to task for entire OT session without distraction. Pt does have max  difficulty with divided attn, requiring mod/max cues. Pt with mod/max difficulty with auditory and visual processing of 1-2 step directions requiring mod/max verbal and visual cues for task completion. A memory book has been established, with a daily habit tracker and memory log. Pt requires cues from her  to consistently complete and use memory book. A family tree has been developed to aid in recall of family members names and relationships. Pt currently requires max verbal and visual cues to correctly identify her family. Pt still presents with the following deficits: STM, LTM/delayed recall, processing speed, direction following, multitasking/dual tasking, attention, divided attention/alternating attention, and mental manipulation impacting ability to independently and safely complete ADL/IADL and leisure tasks. Pt does demo the need for continued skilled Occupational Therapy services 1-2x/week for 12 weeks with focus on ADL re-training, IADL re-training, fxnl cognition, short term memory, long term memory, fxnl attention, family training/education, and leisure pursuits to address the goals as listed below. Pt in agreement with POC, POC to  3/25/25.        Short Term Goals (to be achieved within 4 weeks):  Pt will maintain attention to task for 45 minutes in multimodal environment for improved memory/ immediate recall for increased engagement in IADLs and leisure activities MET  Pt will improve auditory and visual processing to follow 1 step directions with 60% accuracy for increased engagement in IADLs and leisure activities PROGRESSING  Pt will demo G recall of 50% of Written and verbal information utilizing memory strategy of choice for improved STM/delayed memory and improved ability to engage in ADLs/IADLs/ work and leisure activities. PROGRESSING  Pt will identify 3-5 leisure activities that she enjoys, which incorporates cognitive and physical aspects to increase overall health and  independence with ADLs/IADLs and leisure activities MET  Pt will utilize memory book to recall family member names and relationships with min cueing NEW GOAL    Long Term Goals (to be achieved within 12 weeks):    Pt will improve auditory and visual processing speed to follow 2 step directions with processing time of <1min and 80% accuracy for improved IADL performance and engagement in leisure activities. D/C GOAL  Pt will demo ability to follow basic 1 step commands for engagement in leisure activities and completion of IADLs. NEW GOAL  Pt will demo G carryover of use of internal/external memory strategy aides for improved recall of daily events, improved executive functioning with 70% accuracy PROGRESSING  Pt will consistently engage in weekly leisure activities to allow for optimal cognitive functioning. ONGOING    Pt will be S with HEP, with assistance from  ONGOING   Complete pt/ family education on safety strategies, including home modifications to ensure pt safety with declining memory NEW GOAL          SUBJECTIVE      SUBJECTIVE: pt with decreased ability to state her full name; difficulty with expression     PATIENT GOAL: to improve memory and attention         HISTORY OF PRESENT ILLNESS:     Pt is a 74 y.o. female who was referred to Occupational Therapy s/p  Alzheimer dementia without behavioral disturbance (HCC) [G30.9, F02.80]. Pt under the care of neurology since 2021 for declining memory, recently sought a second opinion after receiving Alzheimer's dementia diagnosis. Pt referred to OT to maximize cognitive functioning. Pt has also been referred for a  evaluation.       PMH: palpitations, AD, anxiety, insomnia, and hypercholesteremia.     Past Surgical Hx: History reviewed. No pertinent surgical history.       HOME SETUP/ CLOF: lives with , 2 SH, 2 DIANE, power room on 1st floor; bed/ full bath on 2nd floor; laundry machines on 2nd floor     ADLs: I with all ADLs    IADLs: pt  reports doing laundry and cooking; pt's  manages finances; pt manages own medications; uses a pill box   (-) ; stopped driving about 2 months ago; doctor reported to [place], with pt reporting she did surrender her license but is upset and wants to be able to return to driving. A  evaluation was ordered for further assessment of driving abilities and safety.     Functional Mobility: I with functional mobility; reports no difficulties with balance; (-) dizziness     Work: retired RN    Physical activity/ leisure engagement: walks with  2-3x/ week, gardening; inconsistent engagement with word searches, card games, and reading      Pain Levels: none          OBJECTIVE         PHYSICAL ASSESSMENT    R handed     UE ROM LUE AROM  RUE AROM COMMENTS   Shoulder Flex WNL WNL    Shoulder Ext WNL  WNL    Shoulder ABD WNL  WNL    Horizontal ABD WNL  WNL    Horizontal ADD WNL  WNL    Shoulder IR  WNL  WNL    Shoulder ER WNL WNL    Elbow Flex WNL WNL    Elbow Ext  WNL WNL    Supination  WNL WNL    Pronation  WNL WNL    Wrist Flex WNL WNL    Wrist Ext WNL WNL    Finger Flex WNL WNL    Finger Ext WNL WNL    Opposition  WNL WNL                               MMT  LUE RUE   Comments   Shoulder Flex/Ext 5/5 5/5    Shoulder Abd 5/5 5/5    Shoulder ER 5/5 5/5    Shoulder IR 5/5 5/5    Elbow Flex 5/5 5/5    Elbow Ext 5/5 5/5    Wrist Flex 5/5 5/5    Wrist Ext 5/5 5/5    Gross Grasp 5/5 5/5            SENSATION LUE RUE Comments   Sharp Dull  Intact Intact    Proprioception Intact Intact    Pain Intact Intact    Hot/Cold Temp Intact Intact              COGNITIVE ASSESSMENT      Cognitive Assessment:   Memory: Impaired working memory; impaired STM/LTM; requires mod/max cueing to recall daily and past events; max cues to recall her address, family members names and relationships; pt demo F carryover of strategies    Attention: able to sustain attn to task in session for 45 min; max difficulty with divided  attn  Processing: delayed processing; mod/max difficulty answering simple questions; impaired comprehension of 1 step directions  Executive functioning: impaired mental manipulation   Communication: mod/max difficulty with expression and completing sentences       Compensatory strategies:  Memory Book: pt's  does assist with completion    -habit tracker for nutrition shake, water intake, medication and leisure engagement for cognitive exercise    -Family Tree, including pictures of immediate family and grandchildren: requires max cueing to correctly identify family members, even with use of pictures      IADL engagement:    Medication:  provides supervision to ensure consistency with use of eye drops   Cooking/meal prep: pt with decreased engagement in cooking; discussed meal service options and pre-made food at the grocery stores to decrease cooking demands    HEP:  Increase engagement in leisure activities to increase verbalizations/ communication/ socialization and cognitive engagement       Cognitive checklist:  pt reporting difficulty with the following:     Memory: Remembering what people have told you, Remembering peoples' names, Remembering the date/day of the week, and Keeping track of your appointments    Attention: Easily distracted, Dividing your attention (i.e., multi-tasking), and Losing your train of thought    Processing: Processing new information , Following directions, and Responding to questions in a timely manner     Executive Functions: Organizing your thoughts and Planning daily tasks    Communication: Word finding in conversation, Expressing thoughts and ideas fluently, and Expressing thoughts and ideas into writing    Visual: Losing spot on the page when reading            VISUAL ASSESSMENT        Vision Screen     ROM WFL   Tracking WFL   Saccades WFL   Convergence/ Divergence WFL       Today's Session:    Cognitive re-training:    Memory Book:  -Family Tree: pt required max  cues to correctly identify family members, even with use of pictures and printed names by pictures     Compensatory Strategies:  -Recommended identification bracelet, with pt's name and contact number, as pt with difficulty recalling own name, unable to recall address and has difficulty identifying her  by relationship. Pt's  reporting that pt did attempt to follow a stranger out of [place], mistaking him for her .     Nutrition:  -Recommended use of Nuun tablets or Liquid IV to flavor water and as added hydration, as pt with decreased water intake.             PLANNED THERAPY INTERVENTIONS:  Therapeutic activity  Therapeutic exercise  Neuromuscular re-education   Cognitive re-training   ADL re-training   IADL re-training  Internal and external memory aides  Sustained/alternating/divided attention  Memory and mental manipulation  Auditory processing with immediate recall  Memory retention with immediate and delayed recall         INTERVENTION COMMENTS:  Diagnosis: Alzheimer dementia without behavioral disturbance (HCC) [G30.9, F02.80]  Precautions: impaired memory   Insurance: Payor: MEDICARE / Plan: MEDICARE A AND B / Product Type: Medicare A & B Fee for Service /   Visit: 38  PN due on 10th visit, currently on 1/10            Auth Tracker  DATE 12/31        Visit # 38, 1/10

## 2025-01-07 ENCOUNTER — OFFICE VISIT (OUTPATIENT)
Facility: CLINIC | Age: 75
End: 2025-01-07
Payer: MEDICARE

## 2025-01-07 DIAGNOSIS — F02.80 ALZHEIMER DEMENTIA WITHOUT BEHAVIORAL DISTURBANCE (HCC): Primary | ICD-10-CM

## 2025-01-07 DIAGNOSIS — G30.9 ALZHEIMER DEMENTIA WITHOUT BEHAVIORAL DISTURBANCE (HCC): Primary | ICD-10-CM

## 2025-01-07 PROCEDURE — 97530 THERAPEUTIC ACTIVITIES: CPT

## 2025-01-07 NOTE — PROGRESS NOTES
Occupational Therapy Daily Note:    Today's date: 2025  Patient name: Arely Wood  : 1950  MRN: [id number]  Referring provider: Divine Polk MD  Dx:   Encounter Diagnosis   Name Primary?    Alzheimer dementia without behavioral disturbance (HCC) Yes         Subjective: no complaints      Objective: Pt engaged in skilled OT treatment session with focus on fxnl cognition, short term memory, long term memory, and fxnl attention to increase engagement, endurance, tolerance, and independence with daily ADL and IADL tasks.         PT Code Minutes                                           Task Details        Therapeutic Activity  Cognitive re-training:  -Word recall with family recognition: pt able to state 's name with max increased time and cueing. Pt required max cues and use of family tree to recall names of children and grandchildren.  provided more photos of family members and new family tree was made.  -Word recall/ memory with address: pt unable to state her address; when provided with a choice of 2-3, pt able to correctly identify her house number, street name, town, state and zip code.   -Pt filled out paper with her name, husbands name, children's name and house address. Required max cues for recall of information with max visual and verbal cues to accurately write on paper.     reports max difficulty with pt signing name on check at bank                      Neuro Re-Ed                 Therapeutic Exercise               Manual          HEP  Continue to log habit tracker at home to improve carryover of cognitively stimulating tasks as leisure activities within the home environment.      : Initiated memory book     : Engagement in leisure activity to increase cognitive stimulation:   -engagement in at least 1 activity daily and document in activity book   -habit tracker for leisure engagement                 Assessment: Tolerated treatment well. Cont to require visual  and verbal cues to recall family member's names. Able to start family tree with photos of family.  to work on getting remainder of family members photos. Will cont to address.       Plan: Continued skilled OT per POC    INTERVENTION COMMENTS:  Diagnosis: Alzheimer's dementia without behavioral disturbance (HCC) [G30.9, F02.80]  Precautions: impaired memory   Insurance: Payor: MEDICARE / Plan: MEDICARE A AND B / Product Type: Medicare A & B Fee for Service /   Visit: 39  Currently 2/10  POC expires: 3/25/25     Auth Tracker  DATE 12/31 1/7           Visit # 38, 1/10  39, 2/10

## 2025-01-14 ENCOUNTER — OFFICE VISIT (OUTPATIENT)
Facility: CLINIC | Age: 75
End: 2025-01-14
Payer: MEDICARE

## 2025-01-14 DIAGNOSIS — F02.80 ALZHEIMER DEMENTIA WITHOUT BEHAVIORAL DISTURBANCE (HCC): Primary | ICD-10-CM

## 2025-01-14 DIAGNOSIS — G30.9 ALZHEIMER DEMENTIA WITHOUT BEHAVIORAL DISTURBANCE (HCC): Primary | ICD-10-CM

## 2025-01-14 PROCEDURE — 97530 THERAPEUTIC ACTIVITIES: CPT

## 2025-01-14 NOTE — PROGRESS NOTES
Occupational Therapy Daily Note:    Today's date: 2025  Patient name: Arely Wood  : 1950  MRN: [id number]  Referring provider: Divine Polk MD  Dx:   Encounter Diagnosis   Name Primary?    Alzheimer dementia without behavioral disturbance (HCC) Yes         Subjective: no complaints      Objective: Pt engaged in skilled OT treatment session with focus on fxnl cognition, short term memory, long term memory, and fxnl attention to increase engagement, endurance, tolerance, and independence with daily ADL and IADL tasks.         PT Code Minutes                                           Task Details        Therapeutic Activity  Cognitive re-training:  -Word recall with family recognition: pt able to state 's name with max increased time and cueing. Pt required max cues and use of family tree to recall names of children and grandchildren.   -Word recall/ memory with address: pt unable to state her address; when provided with a choice of 2-3, pt able to correctly identify her street name, town, state and zip code. Mod cues to ID house number.   -Pt filled out paper with her grand children's names. Required max cues for recall of information with max visual and verbal cues to accurately write on paper.     reports max difficulty with pt signing name on check at bank  Practiced signing blank check with max cues and difficulty. Practiced signing name 3x on blank paper with inc'd legability. Educated  to cue her simply when signing checks at the bank and to discuss possible  POA paperwork.                       Neuro Re-Ed                 Therapeutic Exercise               Manual          HEP  Continue to log habit tracker at home to improve carryover of cognitively stimulating tasks as leisure activities within the home environment.      : Initiated memory book     : Engagement in leisure activity to increase cognitive stimulation:   -engagement in at least 1 activity daily and  document in activity book   -habit tracker for leisure engagement                 Assessment: Tolerated treatment well. Cont to require visual and verbal cues to recall family member's names.  Would benefit from continued skilled   OT tx.     Plan: Continued skilled OT per POC    INTERVENTION COMMENTS:  Diagnosis: Alzheimer's dementia without behavioral disturbance (HCC) [G30.9, F02.80]  Precautions: impaired memory   Insurance: Payor: MEDICARE / Plan: MEDICARE A AND B / Product Type: Medicare A & B Fee for Service /   Visit: 40  Currently 3/10  POC expires: 3/25/25     Auth Tracker  DATE 12/31 1/7 1/14         Visit # 38, 1/10  39, 2/10  40, 3/10

## 2025-01-21 ENCOUNTER — OFFICE VISIT (OUTPATIENT)
Facility: CLINIC | Age: 75
End: 2025-01-21
Payer: MEDICARE

## 2025-01-21 DIAGNOSIS — G30.9 ALZHEIMER DEMENTIA WITHOUT BEHAVIORAL DISTURBANCE (HCC): Primary | ICD-10-CM

## 2025-01-21 DIAGNOSIS — F02.80 ALZHEIMER DEMENTIA WITHOUT BEHAVIORAL DISTURBANCE (HCC): Primary | ICD-10-CM

## 2025-01-21 PROCEDURE — 97530 THERAPEUTIC ACTIVITIES: CPT

## 2025-01-21 NOTE — PROGRESS NOTES
Occupational Therapy Daily Note:    Today's date: 2025  Patient name: Arely Wood  : 1950  MRN: [id number]  Referring provider: Divine Polk MD  Dx:   Encounter Diagnosis   Name Primary?    Alzheimer dementia without behavioral disturbance (HCC) Yes         Subjective: no complaints      Objective: Pt engaged in skilled OT treatment session with focus on fxnl cognition, short term memory, long term memory, and fxnl attention to increase engagement, endurance, tolerance, and independence with daily ADL and IADL tasks.         PT Code Minutes                                           Task Details        Therapeutic Activity  Cognitive re-training:  -Word recall with family recognition: pt able to state 's name with max increased time and max cueing. Pt required max cues and use of family tree to recall names of children and grandchildren.   -Word recall/ memory with address: pt unable to state her address; when provided with a choice of 2-3, pt able to correctly identify her street name, town, state and zip code. Mod cues to ID house number.   -Finalized family tree with photos  provided  - Able to sign name x 4 trials with max verbal and visual cues      reports pt having difficulty locating everyday items around the house. Reports pt trys to help clean up and puts them in random places.  setting pt up for all ADLS and cueing her to initiate tasks. Discussed labeling common items pt uses (kitchen cabinets/dresser),  doesn't think this would help.                       Neuro Re-Ed                 Therapeutic Exercise               Manual          HEP  Continue to log habit tracker at home to improve carryover of cognitively stimulating tasks as leisure activities within the home environment.      : Initiated memory book     : Engagement in leisure activity to increase cognitive stimulation:   -engagement in at least 1 activity daily and document in activity  book   -habit tracker for leisure engagement                 Assessment: Tolerated treatment well. Cont to require visual and verbal cues to recall family member's names.  Would benefit from continued skilled   OT tx.     Plan: Continued skilled OT per POC    INTERVENTION COMMENTS:  Diagnosis: Alzheimer's dementia without behavioral disturbance (HCC) [G30.9, F02.80]  Precautions: impaired memory   Insurance: Payor: MEDICARE / Plan: MEDICARE A AND B / Product Type: Medicare A & B Fee for Service /   Visit: 41  Currently 4/10  POC expires: 3/25/25     Auth Tracker  DATE 12/31 1/7 1/14 1/21       Visit # 38, 1/10  39, 2/10  40, 3/10  41, 4/10

## 2025-01-28 ENCOUNTER — OFFICE VISIT (OUTPATIENT)
Facility: CLINIC | Age: 75
End: 2025-01-28
Payer: MEDICARE

## 2025-01-28 DIAGNOSIS — F02.80 ALZHEIMER DEMENTIA WITHOUT BEHAVIORAL DISTURBANCE (HCC): Primary | ICD-10-CM

## 2025-01-28 DIAGNOSIS — G30.9 ALZHEIMER DEMENTIA WITHOUT BEHAVIORAL DISTURBANCE (HCC): Primary | ICD-10-CM

## 2025-01-28 PROCEDURE — 97530 THERAPEUTIC ACTIVITIES: CPT

## 2025-01-28 NOTE — PROGRESS NOTES
Occupational Therapy Daily Note:    Today's date: 2025  Patient name: Arely Wood  : 1950  MRN: [id number]  Referring provider: Divine Polk MD  Dx:   Encounter Diagnosis   Name Primary?    Alzheimer dementia without behavioral disturbance (HCC) Yes         Subjective: no complaints      Objective: Pt engaged in skilled OT treatment session with focus on fxnl cognition, short term memory, long term memory, and fxnl attention to increase engagement, endurance, tolerance, and independence with daily ADL and IADL tasks.         PT Code Minutes                                           Task Details        Therapeutic Activity  Cognitive re-training:  -Word recall with family recognition: pt able to state 's name with max increased time and mod cueing. Pt required mod cues and use of family tree to recall names of children and grandchildren.   -Word recall/ memory with address: pt unable to state her address; when provided with a choice of 2-3, pt able to correctly identify her street name, town, state and zip code. Mod cues to ID house number.   - Able to sign name x 4 trials with mod verbal and visual cues     At end of session pt able to use visual aid to identity street and city where she lives and state husbands name x 5 trials with min difficulty.     Discussed pt perseverating on location of family members, specifically son who passed away.  originally was reorienting pt to sons passing but has since starting telling pt he is either busy or working to ease her pain of remembering he has passed. Discussed  getting time to himself and having dtr stay with pt. Per  he's doing fine, doesn't feel burnt out or feel like he needs a break.                       Neuro Re-Ed                 Therapeutic Exercise               Manual          HEP  Continue to log habit tracker at home to improve carryover of cognitively stimulating tasks as leisure activities within the home  environment.      6/20: Initiated memory book     7/17: Engagement in leisure activity to increase cognitive stimulation:   -engagement in at least 1 activity daily and document in activity book   -habit tracker for leisure engagement                 Assessment: Tolerated treatment well. Cont to require visual and verbal cues to recall family member's names.  Would benefit from continued skilled   OT tx.     Plan: Continued skilled OT per POC    INTERVENTION COMMENTS:  Diagnosis: Alzheimer's dementia without behavioral disturbance (HCC) [G30.9, F02.80]  Precautions: impaired memory   Insurance: Payor: MEDICARE / Plan: MEDICARE A AND B / Product Type: Medicare A & B Fee for Service /   Visit: 42  Currently 5/10  POC expires: 3/25/25     Auth Tracker  DATE 12/31 1/7 1/14 1/21 1/28     Visit # 38, 1/10  39, 2/10  40, 3/10  41, 4/10  42, 5/10

## 2025-02-04 ENCOUNTER — OFFICE VISIT (OUTPATIENT)
Facility: CLINIC | Age: 75
End: 2025-02-04
Payer: MEDICARE

## 2025-02-04 DIAGNOSIS — G30.9 ALZHEIMER DEMENTIA WITHOUT BEHAVIORAL DISTURBANCE (HCC): Primary | ICD-10-CM

## 2025-02-04 DIAGNOSIS — F02.80 ALZHEIMER DEMENTIA WITHOUT BEHAVIORAL DISTURBANCE (HCC): Primary | ICD-10-CM

## 2025-02-04 PROCEDURE — 97530 THERAPEUTIC ACTIVITIES: CPT

## 2025-02-04 NOTE — PROGRESS NOTES
Occupational Therapy Daily Note:    Today's date: 2025  Patient name: Arely Wood  : 1950  MRN: [id number]  Referring provider: Divine Polk MD  Dx:   Encounter Diagnosis   Name Primary?    Alzheimer dementia without behavioral disturbance (HCC) Yes         Subjective: no complaints      Objective: Pt engaged in skilled OT treatment session with focus on fxnl cognition, short term memory, long term memory, and fxnl attention to increase engagement, endurance, tolerance, and independence with daily ADL and IADL tasks.         PT Code Minutes                                           Task Details        Therapeutic Activity  Cognitive re-training:  -Word recall with family recognition: pt able to state 's name with max increased time and mod cueing. Pt required mod cues and use of family tree to recall names of children and grandchildren.   -Word recall/ memory with address: pt unable to state her address; when provided with a choice of 2-3, pt able to correctly identify her street name, town, state and zip code. Mod cues to ID house number.       At end of session pt able to use visual aid to identity street and city where she lives and state husbands name x 5 trials with mod difficulty.     Discussed pt perseverating on location of family members, specifically son who passed away.  able to redirect easily by telling her son is at work or busy.  reports difficulty getting pt to drink water and pt gets verbally agitated. Educated pt on importance of adequate water intake and encouraged to drink during session.                       Neuro Re-Ed                 Therapeutic Exercise               Manual          HEP  Continue to log habit tracker at home to improve carryover of cognitively stimulating tasks as leisure activities within the home environment.      : Initiated memory book     : Engagement in leisure activity to increase cognitive stimulation:   -engagement in at  least 1 activity daily and document in activity book   -habit tracker for leisure engagement                 Assessment: Tolerated treatment well. Cont to require visual and verbal cues to recall family member's names.  Would benefit from continued skilled   OT tx.     Plan: Continued skilled OT per POC    INTERVENTION COMMENTS:  Diagnosis: Alzheimer's dementia without behavioral disturbance (HCC) [G30.9, F02.80]  Precautions: impaired memory   Insurance: Payor: MEDICARE / Plan: MEDICARE A AND B / Product Type: Medicare A & B Fee for Service /   Visit: 43  Currently 6/10  POC expires: 3/25/25     Auth Tracker  DATE 12/31 1/7 1/14 1/21 1/28 2/4   Visit # 38, 1/10  39, 2/10  40, 3/10  41, 4/10  42, 5/10  43, 6/10

## 2025-02-11 ENCOUNTER — OFFICE VISIT (OUTPATIENT)
Facility: CLINIC | Age: 75
End: 2025-02-11
Payer: MEDICARE

## 2025-02-11 DIAGNOSIS — F02.80 ALZHEIMER DEMENTIA WITHOUT BEHAVIORAL DISTURBANCE (HCC): Primary | ICD-10-CM

## 2025-02-11 DIAGNOSIS — G30.9 ALZHEIMER DEMENTIA WITHOUT BEHAVIORAL DISTURBANCE (HCC): Primary | ICD-10-CM

## 2025-02-11 PROCEDURE — 97530 THERAPEUTIC ACTIVITIES: CPT

## 2025-02-11 NOTE — PROGRESS NOTES
Occupational Therapy Daily Note:    Today's date: 2025  Patient name: Arely Wood  : 1950  MRN: [id number]  Referring provider: Divine Polk MD  Dx:   Encounter Diagnosis   Name Primary?    Alzheimer dementia without behavioral disturbance (HCC) Yes         Subjective: no complaints      Objective: Pt engaged in skilled OT treatment session with focus on fxnl cognition, short term memory, long term memory, and fxnl attention to increase engagement, endurance, tolerance, and independence with daily ADL and IADL tasks.         PT Code Minutes                                           Task Details        Therapeutic Activity  Cognitive re-training:  -Word recall with family recognition: pt able to state 's name with max increased time and mod cueing. Pt required mod cues and use of family tree to recall names of children and grandchildren.   -Word recall/ memory with address: pt unable to state her address; when provided with a choice of 2, pt able to correctly identify her street name, town, state and zip code. Mod cues to ID house number.       Pt able to use visual aid to identity street and city where she lives and state husbands name x 5 trials with mod difficulty.     Discussed pt perseverating on location of son who passed away. Reinforced to pt that her son is safe and happy and even if she isnt seeing him she needs to remind herself he is ok.  states he is able to redirect by telling her son is at work or busy but her perseveration on sons location is getting worse. Discussed playing old videos of son to show pt he is ok.  reports difficulty getting pt to drink water and pt gets verbally agitated. Educated pt on importance of adequate water intake and encouraged to drink during session.                       Neuro Re-Ed                 Therapeutic Exercise               Manual          HEP  Continue to log habit tracker at home to improve carryover of cognitively  stimulating tasks as leisure activities within the home environment.      6/20: Initiated memory book     7/17: Engagement in leisure activity to increase cognitive stimulation:   -engagement in at least 1 activity daily and document in activity book   -habit tracker for leisure engagement                 Assessment: Tolerated treatment well. Cont to require visual and verbal cues to recall family member's names.  Would benefit from continued skilled   OT tx.     Plan: Continued skilled OT per POC    INTERVENTION COMMENTS:  Diagnosis: Alzheimer's dementia without behavioral disturbance (HCC) [G30.9, F02.80]  Precautions: impaired memory   Insurance: Payor: MEDICARE / Plan: MEDICARE A AND B / Product Type: Medicare A & B Fee for Service /   Visit: 44  Currently 7/10  POC expires: 3/25/25     Auth Tracker  DATE 2/11 1/7 1/14 1/21 1/28 2/4   Visit # 44, 7/10  39, 2/10  40, 3/10  41, 4/10  42, 5/10  43, 6/10

## 2025-02-18 ENCOUNTER — OFFICE VISIT (OUTPATIENT)
Facility: CLINIC | Age: 75
End: 2025-02-18
Payer: MEDICARE

## 2025-02-18 DIAGNOSIS — G30.9 ALZHEIMER DEMENTIA WITHOUT BEHAVIORAL DISTURBANCE (HCC): Primary | ICD-10-CM

## 2025-02-18 DIAGNOSIS — F02.80 ALZHEIMER DEMENTIA WITHOUT BEHAVIORAL DISTURBANCE (HCC): Primary | ICD-10-CM

## 2025-02-18 PROCEDURE — 97530 THERAPEUTIC ACTIVITIES: CPT

## 2025-02-18 NOTE — PROGRESS NOTES
Occupational Therapy Daily Note:    Today's date: 2025  Patient name: Arely Wood  : 1950  MRN: [id number]  Referring provider: Divine Polk MD  Dx:   Encounter Diagnosis   Name Primary?    Alzheimer dementia without behavioral disturbance (HCC) Yes         Subjective: no complaints      Objective: Pt engaged in skilled OT treatment session with focus on fxnl cognition, short term memory, long term memory, and fxnl attention to increase engagement, endurance, tolerance, and independence with daily ADL and IADL tasks.         PT Code Minutes                                           Task Details        Therapeutic Activity  Reviewed therapy goals: tasked pt and pt's  to review pt's functioning at home and identify any other goals/ tasks to address in therapy     Reviewed Memory book:  -pt requires max cues to identify family members and their names      Reviewed pt's functioning at home:  -pt's  reporting that pt is able to wash the dishes by hand but does have difficulty with putting away dishes  -pt able to fold the laundry but has difficulty recalling where to put away all her clothes  -Discussed use of labels and pictures in the bedroom and kitchen to increase pt's ease of locating and putting away items   -pt's  reporting that pt is able to sleep thru the night; reports no wandering behaviors.         HEP completion:  -pt inconsistent with engagement in physical and cognitive activities, with pt's  reporting difficulty with engaging pt in activities   -pt's  reporting that pt does look thru photo albums of her family daily, does have difficulty correctly identifying her family members and their names.   -encouraged engaging in cognitive tasks daily, such as puzzles or games, as well as walking 2-3x week for physical activity                         Neuro Re-Ed                 Therapeutic Exercise               Manual          HEP  Continue to log habit tracker  at home to improve carryover of cognitively stimulating tasks as leisure activities within the home environment.      6/20: Initiated memory book     7/17: Engagement in leisure activity to increase cognitive stimulation:   -engagement in at least 1 activity daily and document in activity book   -habit tracker for leisure engagement                 Assessment: Tolerated treatment well. Reviewed pt's functioning and therapy goals. Will re-address at next session.     Plan: Continued skilled OT per POC    INTERVENTION COMMENTS:  Diagnosis: Alzheimer's dementia without behavioral disturbance (HCC) [G30.9, F02.80]  Precautions: impaired memory   Insurance: Payor: MEDICARE / Plan: MEDICARE A AND B / Product Type: Medicare A & B Fee for Service /   Visit: 45  Currently 8/10  POC expires: 3/25/25     Auth Tracker  DATE 2/11 2/18       Visit # 44, 7/10 8/10

## 2025-02-25 ENCOUNTER — OFFICE VISIT (OUTPATIENT)
Facility: CLINIC | Age: 75
End: 2025-02-25
Payer: MEDICARE

## 2025-02-25 DIAGNOSIS — G30.9 ALZHEIMER DEMENTIA WITHOUT BEHAVIORAL DISTURBANCE (HCC): Primary | ICD-10-CM

## 2025-02-25 DIAGNOSIS — F02.80 ALZHEIMER DEMENTIA WITHOUT BEHAVIORAL DISTURBANCE (HCC): Primary | ICD-10-CM

## 2025-02-25 PROCEDURE — 97530 THERAPEUTIC ACTIVITIES: CPT

## 2025-02-25 NOTE — PROGRESS NOTES
Occupational Therapy Daily Note:    Today's date: 2025  Patient name: Arely Wood  : 1950  MRN: [id number]  Referring provider: Divine Polk MD  Dx:   Encounter Diagnosis   Name Primary?    Alzheimer dementia without behavioral disturbance (HCC) Yes         Subjective: no complaints      Objective: Pt engaged in skilled OT treatment session with focus on fxnl cognition, short term memory, long term memory, and fxnl attention to increase engagement, endurance, tolerance, and independence with daily ADL and IADL tasks.         PT Code Minutes                                           Task Details        Therapeutic Activity  Discussed the following:  Pt's  reporting pt did not sleep last night; perseverated on thinking people were in their home; pt with infrequent sleep disturbances where she does not sleep; has not had an episode like last night in a while   Memory book: pt requiring decreasing cues to reference memory book to locate picture and name of , their family and her address           Follow-up at next session:   Tasked pt and pt's  to review pt's functioning at home and identify any other goals/ tasks to address in therapy   HEP completion: encouraged engaging in cognitive tasks daily, such as puzzles or games, as well as walking 2-3x week for physical activity                Neuro Re-Ed                 Therapeutic Exercise               Manual          HEP  Continue to log habit tracker at home to improve carryover of cognitively stimulating tasks as leisure activities within the home environment.      : Initiated memory book     : Engagement in leisure activity to increase cognitive stimulation:   -engagement in at least 1 activity daily and document in activity book   -habit tracker for leisure engagement                 Assessment: Tolerated treatment well. Reviewed pt's functioning and therapy goals. Will re-address at next session.     Plan: Continued  skilled OT per POC    INTERVENTION COMMENTS:  Diagnosis: Alzheimer's dementia without behavioral disturbance (HCC) [G30.9, F02.80]  Precautions: impaired memory   Insurance: Payor: MEDICARE / Plan: MEDICARE A AND B / Product Type: Medicare A & B Fee for Service /   Currently 9/10  POC expires: 3/25/25     Auth Tracker  DATE 2/11 2/18 2/25      Visit # 7/10 8/10 9/10

## 2025-03-04 ENCOUNTER — OFFICE VISIT (OUTPATIENT)
Facility: CLINIC | Age: 75
End: 2025-03-04
Payer: MEDICARE

## 2025-03-04 DIAGNOSIS — G30.9 ALZHEIMER DEMENTIA WITHOUT BEHAVIORAL DISTURBANCE (HCC): Primary | ICD-10-CM

## 2025-03-04 DIAGNOSIS — F02.80 ALZHEIMER DEMENTIA WITHOUT BEHAVIORAL DISTURBANCE (HCC): Primary | ICD-10-CM

## 2025-03-04 PROCEDURE — 97530 THERAPEUTIC ACTIVITIES: CPT

## 2025-03-04 NOTE — PROGRESS NOTES
Occupational Therapy Daily Note:    Today's date: 3/4/2025  Patient name: Arely Wood  : 1950  MRN: [id number]  Referring provider: Divine Polk MD  Dx:   Encounter Diagnosis   Name Primary?    Alzheimer dementia without behavioral disturbance (HCC) Yes         Subjective: no complaints      Objective: Pt engaged in skilled OT treatment session with focus on fxnl cognition, short term memory, long term memory, and fxnl attention to increase engagement, endurance, tolerance, and independence with daily ADL and IADL tasks.         PT Code Minutes                                           Task Details        Therapeutic Activity  Discussed the following:  Pt's  reporting pt did not sleep well again last night; dtr went in for biopsy and pt worried about her.   Memory book: pt requiring decreasing cues to reference memory book to locate picture and name of , their family and her address           Follow-up at next session:   Tasked pt and pt's  to review pt's functioning at home and identify any other goals/ tasks to address in therapy - reports biggest issue is pt putting things back where they do not belong and having to go on a scavenger hunt' to locate items.   HEP completion: encouraged engaging in cognitive tasks daily, such as puzzles or games, as well as walking 2-3x week for physical activity  -  still having back pain preventing them from walking at this time. Pt not doing cognitive tasks; attempted to complete word search with max cues and max difficulty. Completed letter identification in word search instead with dec'd cues to accurately locate.                Neuro Re-Ed                 Therapeutic Exercise               Manual          HEP  Continue to log habit tracker at home to improve carryover of cognitively stimulating tasks as leisure activities within the home environment.      : Initiated memory book     : Engagement in leisure activity to increase  cognitive stimulation:   -engagement in at least 1 activity daily and document in activity book   -habit tracker for leisure engagement                 Assessment: Tolerated treatment well. Reviewed pt's functioning, difficulty completing cog/physical tasks regularly at home. Will re-address at next session.     Plan: Continued skilled OT per POC    INTERVENTION COMMENTS:  Diagnosis: Alzheimer's dementia without behavioral disturbance (HCC) [G30.9, F02.80]  Precautions: impaired memory   Insurance: Payor: MEDICARE / Plan: MEDICARE A AND B / Product Type: Medicare A & B Fee for Service   Currently 10/10  POC expires: 3/25/25     Auth Tracker  DATE 2/11 2/18 2/25 3/4     Visit # 7/10 8/10 9/10 10/10

## 2025-03-10 ENCOUNTER — OFFICE VISIT (OUTPATIENT)
Facility: CLINIC | Age: 75
End: 2025-03-10
Payer: MEDICARE

## 2025-03-10 DIAGNOSIS — F02.80 ALZHEIMER DEMENTIA WITHOUT BEHAVIORAL DISTURBANCE (HCC): Primary | ICD-10-CM

## 2025-03-10 DIAGNOSIS — G30.9 ALZHEIMER DEMENTIA WITHOUT BEHAVIORAL DISTURBANCE (HCC): Primary | ICD-10-CM

## 2025-03-10 PROCEDURE — 97530 THERAPEUTIC ACTIVITIES: CPT

## 2025-03-10 NOTE — PROGRESS NOTES
Occupational Therapy Daily Note:    Today's date: 3/10/2025  Patient name: Arely Wood  : 1950  MRN: [id number]  Referring provider: Divine Polk MD  Dx:   Encounter Diagnosis   Name Primary?    Alzheimer dementia without behavioral disturbance (HCC) Yes       Subjective: no complaints      Objective: Pt engaged in skilled OT treatment session with focus on fxnl cognition, short term memory, long term memory, and fxnl attention to increase engagement, endurance, tolerance, and independence with daily ADL and IADL tasks.       Short Term Goals (to be achieved within 4 weeks):  Pt will maintain attention to task for 45 minutes in multimodal environment for improved memory/ immediate recall for increased engagement in IADLs and leisure activities MET  Pt will improve auditory and visual processing to follow 1 step directions with 60% accuracy for increased engagement in IADLs and leisure activities PROGRESSING  Pt will demo G recall of 50% of Written and verbal information utilizing memory strategy of choice for improved STM/delayed memory and improved ability to engage in ADLs/IADLs/ work and leisure activities. PROGRESSING  Pt will identify 3-5 leisure activities that she enjoys, which incorporates cognitive and physical aspects to increase overall health and independence with ADLs/IADLs and leisure activities MET  Pt will utilize memory book to recall family member names and relationships with min cueing PROGRESSING    Long Term Goals (to be achieved within 12 weeks):    Pt will improve auditory and visual processing speed to follow 2 step directions with processing time of <1min and 80% accuracy for improved IADL performance and engagement in leisure activities. D/C GOAL  Pt will demo ability to follow basic 1 step commands for engagement in leisure activities and completion of IADLs. PROGRESSING  Pt will demo G carryover of use of internal/external memory strategy aides for improved recall of daily  events, improved executive functioning with 70% accuracy PROGRESSING  Pt will consistently engage in weekly leisure activities to allow for optimal cognitive functioning. ONGOING    Pt will be S with HEP, with assistance from  ONGOING   Complete pt/ family education on safety strategies, including home modifications to ensure pt safety with declining memory ONGOING          PATIENT GOAL: to improve memory and attention         PT Code Minutes                                           Task Details        Therapeutic Activity  Discussed the following:  Memory Book: pt requiring decreasing cues to reference memory book to locate picture and name of , their family and her address           Engaged in animal matching cards activity, focusing on name and picture recognition, as well as answering simple questions about each animal/ picture. Pt able to independently read the name of the animal on the card; min cues and increased time to locate matching card on tabletop surface. Pt with max difficulty answering basic questions describing the animal and what she sees in the picture                  Neuro Re-Ed                 Therapeutic Exercise               Manual          HEP  HEP completion: encouraged engaging in cognitive tasks daily, such as puzzles or games, as well as walking 2-3x week for physical activity     Continue to log habit tracker at home to improve carryover of cognitively stimulating tasks as leisure activities within the home environment.      6/20: Initiated memory book     7/17: Engagement in leisure activity to increase cognitive stimulation:   -engagement in at least 1 activity daily and document in activity book   -habit tracker for leisure engagement                 Assessment: Tolerated treatment well.  Pt with improving carryover of memory book, requiring decreased cues for family members names and identification. Pt/ pts  have been educated on cognitive HEP and cognitive  compensatory strategies to increase carryover at home. Pt with difficulty with completion of cognitive HEP, with noted increased difficulty with direction following and task execution. Would benefit from cont OT to cont to address safety and increase consistency of engagement at home, to maintain level of cognitive function.     Plan: Continued skilled OT per POC    INTERVENTION COMMENTS:  Diagnosis: Alzheimer's dementia without behavioral disturbance (HCC) [G30.9, F02.80]  Precautions: impaired memory   Insurance: Payor: MEDICARE / Plan: MEDICARE A AND B / Product Type: Medicare A & B Fee for Service   Currently 1/10  POC expires: 3/25/25     Auth Tracker  DATE 3/10        Visit # 1

## 2025-03-11 ENCOUNTER — APPOINTMENT (OUTPATIENT)
Facility: CLINIC | Age: 75
End: 2025-03-11
Payer: MEDICARE

## 2025-03-18 ENCOUNTER — OFFICE VISIT (OUTPATIENT)
Facility: CLINIC | Age: 75
End: 2025-03-18
Payer: MEDICARE

## 2025-03-18 DIAGNOSIS — G30.9 ALZHEIMER DEMENTIA WITHOUT BEHAVIORAL DISTURBANCE (HCC): Primary | ICD-10-CM

## 2025-03-18 DIAGNOSIS — F02.80 ALZHEIMER DEMENTIA WITHOUT BEHAVIORAL DISTURBANCE (HCC): Primary | ICD-10-CM

## 2025-03-18 PROCEDURE — 97530 THERAPEUTIC ACTIVITIES: CPT

## 2025-03-18 NOTE — PROGRESS NOTES
Occupational Therapy Daily Note:    Today's date: 3/18/2025  Patient name: Arely Wood  : 1950  MRN: [id number]  Referring provider: Divine Polk MD  Dx:   Encounter Diagnosis   Name Primary?    Alzheimer dementia without behavioral disturbance (HCC) Yes       Subjective: no complaints      Objective: Pt engaged in skilled OT treatment session with focus on fxnl cognition, short term memory, long term memory, and fxnl attention to increase engagement, endurance, tolerance, and independence with daily ADL and IADL tasks.         PT Code Minutes                                           Task Details        Therapeutic Activity  Discussed the following:  Memory Book: pt requiring decreasing cues to reference memory book to locate picture and name of , their family and her address           Engaged in activity placing numbered cubes in ascending order. Pt required min cues to initiate and min cues to locate  numbers with min inc'd time    Placing letter cubes in alphabetical order, completed with mod cueing and inc'd time. Pt then required to pickup letter cube and place on top same letter on paper with mod cues.                  Neuro Re-Ed                 Therapeutic Exercise               Manual          HEP  HEP completion: encouraged engaging in cognitive tasks daily, such as puzzles or games, as well as walking 2-3x week for physical activity     Continue to log habit tracker at home to improve carryover of cognitively stimulating tasks as leisure activities within the home environment.      : Initiated memory book     : Engagement in leisure activity to increase cognitive stimulation:   -engagement in at least 1 activity daily and document in activity book   -habit tracker for leisure engagement                 Assessment: Tolerated treatment well.  Continues to use memory book to locate family members names with cueing. Would benefit from cont OT to cont to address safety and  increase consistency of engagement at home, to maintain level of cognitive function.     Plan: Continued skilled OT per POC    INTERVENTION COMMENTS:  Diagnosis: Alzheimer's dementia without behavioral disturbance (HCC) [G30.9, F02.80]  Precautions: impaired memory   Insurance: Payor: MEDICARE / Plan: MEDICARE A AND B / Product Type: Medicare A & B Fee for Service   Currently 2/10  POC expires: 3/25/25     Auth Tracker  DATE 3/10 3/18       Visit # 1 2

## 2025-03-25 ENCOUNTER — EVALUATION (OUTPATIENT)
Facility: CLINIC | Age: 75
End: 2025-03-25
Payer: MEDICARE

## 2025-03-25 DIAGNOSIS — G30.9 ALZHEIMER DEMENTIA WITHOUT BEHAVIORAL DISTURBANCE (HCC): Primary | ICD-10-CM

## 2025-03-25 DIAGNOSIS — F02.80 ALZHEIMER DEMENTIA WITHOUT BEHAVIORAL DISTURBANCE (HCC): Primary | ICD-10-CM

## 2025-03-25 PROCEDURE — 97530 THERAPEUTIC ACTIVITIES: CPT

## 2025-03-25 PROCEDURE — 97168 OT RE-EVAL EST PLAN CARE: CPT

## 2025-03-25 NOTE — LETTER
March 25, 2025    Divine Polk MD  [address] [address]    Patient: Arely Wood   YOB: 1950   Date of Visit: 3/25/2025     Encounter Diagnosis     ICD-10-CM    1. Alzheimer dementia without behavioral disturbance (HCC)  G30.9     F02.80           Dear Dr. Polk:    Thank you for your recent referral of Arely Wood. Please review the attached evaluation summary from Arely's recent visit.     Please verify that you agree with the plan of care by signing the attached order.     If you have any questions or concerns, please do not hesitate to call.     I sincerely appreciate the opportunity to share in the care of one of your patients and hope to have another opportunity to work with you in the near future.     Sincerely,    Yandy Fox, OT      Referring Provider:     I certify that I have read the below Plan of Care and certify the need for these services furnished under this plan of treatment while under my care.                    Divine Polk MD  [address] [address]  Via Fax: [phone]        OCCUPATIONAL THERAPY RE-EVALUATION        3/25/25  Arely Wood  1950  [id number]  Divine Polk MD   Diagnosis ICD-10-CM Associated Orders   1. Alzheimer dementia without behavioral disturbance (HCC)  G30.9     F02.80               Assessment/Plan      SKILLED ANALYSIS:    Pt is a 74 y.o. female referred to Occupational Therapy s/p Alzheimer dementia without behavioral disturbance (HCC) [G30.9, F02.80].  Pt participates in outpatient OT focused on functional cognition and increased engagement in leisure activities for improved cognitive stimulation. Pt continues with a cognitive decline, with increased difficulty with STM and recall, including difficulty with recognizing her own . A memory book has been established, with a daily habit tracker and memory log. A family tree has been developed to aid in recall of family  member's names and relationships. Pt currently requires max verbal and visual cues to correctly identify her family. Pt has been able to identify leisure activities she enjoys, however does require max cues for consistent engagement. Pt able to sustain attn to task for 45 min, but does have max difficulty with divided attention. Pt with mod/max difficulty with auditory and visual processing of 1 step directions requiring mod/max verbal and visual cues for task completion. As pt cont to demo cognitive decline, it is recommended that pt would best benefit from home health services to more appropriately establish a daily routine and external memory cues in her familiar environment to allow for optimal functioning. Pt to cont with OPOT until transition to home services is established. OPOT to cont to address the following deficits: STM, LTM/delayed recall, processing speed, direction following, multitasking/dual tasking, attention, divided attention/alternating attention, and mental manipulation which are impacting pt's ability to independently and safely complete ADL/IADL and leisure tasks. Pt does demo the need for continued skilled Occupational Therapy services 1x/week for 12 weeks with focus on ADL re-training, IADL re-training, fxnl cognition, short term memory, long term memory, fxnl attention, family training/education, and leisure pursuits to address the goals as listed below. Pt in agreement with POC, POC to  25.        Short Term Goals (to be achieved within 4 weeks):    Pt will improve auditory and visual processing to follow 1 step directions with 60% accuracy for increased engagement in IADLs and leisure activities PROGRESSING   Pt will demo G recall of 50% of Written and verbal information utilizing memory strategy of choice for improved STM/delayed memory and improved ability to engage in ADLs/IADLs/ work and leisure activities. PROGRESSING  Pt will identify 3-5 leisure activities that she  "enjoys, which incorporates cognitive and physical aspects to increase overall health and independence with ADLs/IADLs and leisure activities MET  Pt will utilize memory book to recall family member names and relationships with min cueing PROGRESSING     Long Term Goals (to be achieved within 12 weeks):    Pt will demo ability to follow basic 1 step commands for engagement in leisure activities and completion of IADLs. PROGRESSING    Pt will demo G carryover of use of internal/external memory strategy aides for improved recall of daily events, improved executive functioning with 70% accuracy PROGRESSING  Pt will consistently engage in weekly leisure activities to allow for optimal cognitive functioning. ONGOING    Pt will be S with HEP, with assistance from  ONGOING   Complete pt/ family education on safety strategies, including home modifications to ensure pt safety with declining memory ONGOING           SUBJECTIVE      SUBJECTIVE: \"I didn't know that\" (pt with decreased recall of all daily events)    PATIENT GOAL: to improve memory and attention         HISTORY OF PRESENT ILLNESS:     Pt is a 74 y.o. female who was referred to Occupational Therapy s/p  Alzheimer dementia without behavioral disturbance (HCC) [G30.9, F02.80]. Pt under the care of neurology since 2021 for declining memory, recently sought a second opinion after receiving Alzheimer's dementia diagnosis. Pt referred to OT to maximize cognitive functioning. Pt has also been referred for a  evaluation.       PMH: palpitations, AD, anxiety, insomnia, and hypercholesteremia.     Past Surgical Hx: History reviewed. No pertinent surgical history.       HOME SETUP/ CLOF: lives with , 2 SH, 2 DIANE, power room on 1st floor; bed/ full bath on 2nd floor; laundry machines on 2nd floor     ADLs: I with all ADLs    IADLs: pt reports doing laundry and cooking; pt's  manages finances; pt S with medications  (-)      Functional Mobility: " I with functional mobility; reports no difficulties with balance; (-) dizziness     Work: retired RN    Physical activity/ leisure engagement: walks with  2-3x/ week, gardening; inconsistent engagement with word searches, card games, and reading      Pain Levels: none          OBJECTIVE         PHYSICAL ASSESSMENT    R handed     UE ROM LUE AROM  RUE AROM COMMENTS   Shoulder Flex WNL WNL    Shoulder Ext WNL  WNL    Shoulder ABD WNL  WNL    Horizontal ABD WNL  WNL    Horizontal ADD WNL  WNL    Shoulder IR  WNL  WNL    Shoulder ER WNL WNL    Elbow Flex WNL WNL    Elbow Ext  WNL WNL    Supination  WNL WNL    Pronation  WNL WNL    Wrist Flex WNL WNL    Wrist Ext WNL WNL    Finger Flex WNL WNL    Finger Ext WNL WNL    Opposition  WNL WNL                               MMT  LUE RUE   Comments   Shoulder Flex/Ext 5/5 5/5    Shoulder Abd 5/5 5/5    Shoulder ER 5/5 5/5    Shoulder IR 5/5 5/5    Elbow Flex 5/5 5/5    Elbow Ext 5/5 5/5    Wrist Flex 5/5 5/5    Wrist Ext 5/5 5/5    Gross Grasp 5/5 5/5            SENSATION LUE RUE Comments   Sharp Dull  Intact Intact    Proprioception Intact Intact    Pain Intact Intact    Hot/Cold Temp Intact Intact              COGNITIVE ASSESSMENT      Cognitive Assessment:   Memory: Impaired working memory; impaired STM/LTM; requires max cueing to recall daily and past events; max cues to recall her address, family members names and relationships; pt demo P carryover of strategies  Attention: able to sustain attn to task in session for 45 min; max difficulty with divided attn  Processing: delayed processing; mod/max difficulty answering simple questions; impaired comprehension of 1 step directions  Executive functioning: impaired mental manipulation   Communication: mod/max difficulty with expression and completing sentences       Compensatory strategies:  Memory Book: pt's  does assist with completion    -habit tracker for nutrition shake, water intake, medication and leisure  engagement for cognitive exercise    -Family Tree, including pictures of immediate family and grandchildren: requires max cueing to correctly identify family members, even with use of pictures  Recommended identification bracelet, with pt's name and contact number as pt with difficulty recalling own name, unable to recall address and has difficulty identifying her  by relationship.   Recommended establishing and maintaining at daily schedule   Recommended home therapy to assist with labeling at home and developing appropriate external memory cues       IADL engagement:    Medication:  provides supervision to ensure consistency with use of eye drops   Cooking/meal prep: pt with decreased engagement in cooking; discussed meal service options and pre-made food at the grocery stores to decrease cooking demands    HEP:  Increase engagement in leisure activities to increase verbalizations/ communication/ socialization and cognitive engagement         Cognitive checklist:  pt reporting difficulty with the following:     Memory: Remembering what people have told you, Remembering peoples' names, Remembering the date/day of the week, and Keeping track of your appointments    Attention: Easily distracted, Dividing your attention (i.e., multi-tasking), and Losing your train of thought    Processing: Processing new information , Following directions, and Responding to questions in a timely manner     Executive Functions: Organizing your thoughts and Planning daily tasks    Communication: Word finding in conversation, Expressing thoughts and ideas fluently, and Expressing thoughts and ideas into writing    Visual: Losing spot on the page when reading            VISUAL ASSESSMENT        Vision Screen     ROM WFL   Tracking WFL   Saccades WFL   Convergence/ Divergence WFL       Today's Session:    -pt's  reporting pt with increased confusion the last few days, stating she did not recognize him as her ,  and that she was very upset and in tears over the weekend because she couldn't find her  (even though he was present with her all day); pt also with increased hallucinations, reporting seeing multiple people in her home    -Discussed disease progression and pt's decline in cognitive status; discussed the possibility of pt requiring a memory unit in the future; recommended [facility], with home OT to work with Arely and her  in their home, to assist with establishment of a routine and completion of daily familiar tasks, as well as identifying appropriate external cues in their home to increase recall; recommended reviewing memory book multiple times a day, xander first thing in the morning and in the afternoon, to increase ability to identify her family members.   -pt and  agreeable to home OT  -pt is being followed by a NP and  at a memory center; pt's  to contact the NP for further guidance and disease management; will also reach out to the doctor for a Home health referral.   -Discussed home safety and the potential for wandering, as pt's  reports she is not sleeping well at night and wanders around the house at night time. Pt's  stating there is an alarm on the house, which is set every night.             PLANNED THERAPY INTERVENTIONS:  Therapeutic activity  Therapeutic exercise  Neuromuscular re-education   Cognitive re-training   ADL re-training   IADL re-training  Internal and external memory aides  Sustained/alternating/divided attention  Memory and mental manipulation  Auditory processing with immediate recall  Memory retention with immediate and delayed recall         INTERVENTION COMMENTS:  Diagnosis: Alzheimer dementia without behavioral disturbance (HCC) [G30.9, F02.80]  Precautions: impaired memory   Insurance: Payor: MEDICARE / Plan: MEDICARE A AND B / Product Type: Medicare A & B Fee for Service /   PN due on 10th visit, currently on 1/10            Auth  Tracker  DATE 3/25        Visit # 1/10

## 2025-03-25 NOTE — PROGRESS NOTES
OCCUPATIONAL THERAPY RE-EVALUATION        3/25/25  Arely Wood  1950  [id number]  Divine Polk MD   Diagnosis ICD-10-CM Associated Orders   1. Alzheimer dementia without behavioral disturbance (HCC)  G30.9     F02.80               Assessment/Plan      SKILLED ANALYSIS:    Pt is a 74 y.o. female referred to Occupational Therapy s/p Alzheimer dementia without behavioral disturbance (HCC) [G30.9, F02.80].  Pt participates in outpatient OT focused on functional cognition and increased engagement in leisure activities for improved cognitive stimulation. Pt continues with a cognitive decline, with increased difficulty with STM and recall, including difficulty with recognizing her own . A memory book has been established, with a daily habit tracker and memory log. A family tree has been developed to aid in recall of family member's names and relationships. Pt currently requires max verbal and visual cues to correctly identify her family. Pt has been able to identify leisure activities she enjoys, however does require max cues for consistent engagement. Pt able to sustain attn to task for 45 min, but does have max difficulty with divided attention. Pt with mod/max difficulty with auditory and visual processing of 1 step directions requiring mod/max verbal and visual cues for task completion. As pt cont to demo cognitive decline, it is recommended that pt would best benefit from home health services to more appropriately establish a daily routine and external memory cues in her familiar environment to allow for optimal functioning. Pt to cont with OPOT until transition to home services is established. OPOT to cont to address the following deficits: STM, LTM/delayed recall, processing speed, direction following, multitasking/dual tasking, attention, divided attention/alternating attention, and mental manipulation which are impacting pt's ability to independently and safely complete ADL/IADL and leisure tasks.  "Pt does demo the need for continued skilled Occupational Therapy services 1x/week for 12 weeks with focus on ADL re-training, IADL re-training, fxnl cognition, short term memory, long term memory, fxnl attention, family training/education, and leisure pursuits to address the goals as listed below. Pt in agreement with POC, POC to  25.        Short Term Goals (to be achieved within 4 weeks):    Pt will improve auditory and visual processing to follow 1 step directions with 60% accuracy for increased engagement in IADLs and leisure activities PROGRESSING   Pt will demo G recall of 50% of Written and verbal information utilizing memory strategy of choice for improved STM/delayed memory and improved ability to engage in ADLs/IADLs/ work and leisure activities. PROGRESSING  Pt will identify 3-5 leisure activities that she enjoys, which incorporates cognitive and physical aspects to increase overall health and independence with ADLs/IADLs and leisure activities MET  Pt will utilize memory book to recall family member names and relationships with min cueing PROGRESSING     Long Term Goals (to be achieved within 12 weeks):    Pt will demo ability to follow basic 1 step commands for engagement in leisure activities and completion of IADLs. PROGRESSING    Pt will demo G carryover of use of internal/external memory strategy aides for improved recall of daily events, improved executive functioning with 70% accuracy PROGRESSING  Pt will consistently engage in weekly leisure activities to allow for optimal cognitive functioning. ONGOING    Pt will be S with HEP, with assistance from  ONGOING   Complete pt/ family education on safety strategies, including home modifications to ensure pt safety with declining memory ONGOING           SUBJECTIVE      SUBJECTIVE: \"I didn't know that\" (pt with decreased recall of all daily events)    PATIENT GOAL: to improve memory and attention         HISTORY OF PRESENT ILLNESS: "     Pt is a 74 y.o. female who was referred to Occupational Therapy s/p  Alzheimer dementia without behavioral disturbance (HCC) [G30.9, F02.80]. Pt under the care of neurology since 2021 for declining memory, recently sought a second opinion after receiving Alzheimer's dementia diagnosis. Pt referred to OT to maximize cognitive functioning. Pt has also been referred for a  evaluation.       PMH: palpitations, AD, anxiety, insomnia, and hypercholesteremia.     Past Surgical Hx: History reviewed. No pertinent surgical history.       HOME SETUP/ CLOF: lives with , 2 SH, 2 DIANE, power room on 1st floor; bed/ full bath on 2nd floor; laundry machines on 2nd floor     ADLs: I with all ADLs    IADLs: pt reports doing laundry and cooking; pt's  manages finances; pt S with medications  (-)      Functional Mobility: I with functional mobility; reports no difficulties with balance; (-) dizziness     Work: retired RN    Physical activity/ leisure engagement: walks with  2-3x/ week, gardening; inconsistent engagement with word searches, card games, and reading      Pain Levels: none          OBJECTIVE         PHYSICAL ASSESSMENT    R handed     UE ROM LUE AROM  RUE AROM COMMENTS   Shoulder Flex WNL WNL    Shoulder Ext WNL  WNL    Shoulder ABD WNL  WNL    Horizontal ABD WNL  WNL    Horizontal ADD WNL  WNL    Shoulder IR  WNL  WNL    Shoulder ER WNL WNL    Elbow Flex WNL WNL    Elbow Ext  WNL WNL    Supination  WNL WNL    Pronation  WNL WNL    Wrist Flex WNL WNL    Wrist Ext WNL WNL    Finger Flex WNL WNL    Finger Ext WNL WNL    Opposition  WNL WNL                               MMT  LUE RUE   Comments   Shoulder Flex/Ext 5/5 5/5    Shoulder Abd 5/5 5/5    Shoulder ER 5/5 5/5    Shoulder IR 5/5 5/5    Elbow Flex 5/5 5/5    Elbow Ext 5/5 5/5    Wrist Flex 5/5 5/5    Wrist Ext 5/5 5/5    Gross Grasp 5/5 5/5            SENSATION LUE RUE Comments   Sharp Dull  Intact Intact    Proprioception Intact  Intact    Pain Intact Intact    Hot/Cold Temp Intact Intact              COGNITIVE ASSESSMENT      Cognitive Assessment:   Memory: Impaired working memory; impaired STM/LTM; requires max cueing to recall daily and past events; max cues to recall her address, family members names and relationships; pt demo P carryover of strategies  Attention: able to sustain attn to task in session for 45 min; max difficulty with divided attn  Processing: delayed processing; mod/max difficulty answering simple questions; impaired comprehension of 1 step directions  Executive functioning: impaired mental manipulation   Communication: mod/max difficulty with expression and completing sentences       Compensatory strategies:  Memory Book: pt's  does assist with completion    -habit tracker for nutrition shake, water intake, medication and leisure engagement for cognitive exercise    -Family Tree, including pictures of immediate family and grandchildren: requires max cueing to correctly identify family members, even with use of pictures  Recommended identification bracelet, with pt's name and contact number as pt with difficulty recalling own name, unable to recall address and has difficulty identifying her  by relationship.   Recommended establishing and maintaining at daily schedule   Recommended home therapy to assist with labeling at home and developing appropriate external memory cues       IADL engagement:    Medication:  provides supervision to ensure consistency with use of eye drops   Cooking/meal prep: pt with decreased engagement in cooking; discussed meal service options and pre-made food at the grocery stores to decrease cooking demands    HEP:  Increase engagement in leisure activities to increase verbalizations/ communication/ socialization and cognitive engagement         Cognitive checklist:  pt reporting difficulty with the following:     Memory: Remembering what people have told you, Remembering  peoples' names, Remembering the date/day of the week, and Keeping track of your appointments    Attention: Easily distracted, Dividing your attention (i.e., multi-tasking), and Losing your train of thought    Processing: Processing new information , Following directions, and Responding to questions in a timely manner     Executive Functions: Organizing your thoughts and Planning daily tasks    Communication: Word finding in conversation, Expressing thoughts and ideas fluently, and Expressing thoughts and ideas into writing    Visual: Losing spot on the page when reading            VISUAL ASSESSMENT        Vision Screen     ROM WFL   Tracking WFL   Saccades WFL   Convergence/ Divergence WFL       Today's Session:    -pt's  reporting pt with increased confusion the last few days, stating she did not recognize him as her , and that she was very upset and in tears over the weekend because she couldn't find her  (even though he was present with her all day); pt also with increased hallucinations, reporting seeing multiple people in her home    -Discussed disease progression and pt's decline in cognitive status; discussed the possibility of pt requiring a memory unit in the future; recommended [facility], with home OT to work with Arely and her  in their home, to assist with establishment of a routine and completion of daily familiar tasks, as well as identifying appropriate external cues in their home to increase recall; recommended reviewing memory book multiple times a day, xander first thing in the morning and in the afternoon, to increase ability to identify her family members.   -pt and  agreeable to home OT  -pt is being followed by a NP and  at a memory center; pt's  to contact the NP for further guidance and disease management; will also reach out to the doctor for a Home health referral.   -Discussed home safety and the potential for wandering, as pt's  reports  she is not sleeping well at night and wanders around the house at night time. Pt's  stating there is an alarm on the house, which is set every night.             PLANNED THERAPY INTERVENTIONS:  Therapeutic activity  Therapeutic exercise  Neuromuscular re-education   Cognitive re-training   ADL re-training   IADL re-training  Internal and external memory aides  Sustained/alternating/divided attention  Memory and mental manipulation  Auditory processing with immediate recall  Memory retention with immediate and delayed recall         INTERVENTION COMMENTS:  Diagnosis: Alzheimer dementia without behavioral disturbance (HCC) [G30.9, F02.80]  Precautions: impaired memory   Insurance: Payor: MEDICARE / Plan: MEDICARE A AND B / Product Type: Medicare A & B Fee for Service /   PN due on 10th visit, currently on 1/10            Auth Tracker  DATE 3/25        Visit # 1/10

## 2025-03-31 ENCOUNTER — OFFICE VISIT (OUTPATIENT)
Facility: CLINIC | Age: 75
End: 2025-03-31
Payer: MEDICARE

## 2025-03-31 DIAGNOSIS — F02.80 ALZHEIMER DEMENTIA WITHOUT BEHAVIORAL DISTURBANCE (HCC): Primary | ICD-10-CM

## 2025-03-31 DIAGNOSIS — G30.9 ALZHEIMER DEMENTIA WITHOUT BEHAVIORAL DISTURBANCE (HCC): Primary | ICD-10-CM

## 2025-03-31 PROCEDURE — 97530 THERAPEUTIC ACTIVITIES: CPT

## 2025-03-31 NOTE — PROGRESS NOTES
"Occupational Therapy Daily Note:    Today's date: 3/31/2025  Patient name: Arely Wood  : 1950  MRN: [id number]  Referring provider: Divine Polk MD  Dx:   Encounter Diagnosis   Name Primary?    Alzheimer dementia without behavioral disturbance (HCC) Yes         Subjective: no complaints      Objective: Pt engaged in skilled OT treatment session with focus on fxnl cognition, short term memory, long term memory, and fxnl attention to increase engagement, endurance, tolerance, and independence with daily ADL and IADL tasks.         PT Code Minutes                                           Task Details        Therapeutic Activity  Discussed the following:  Memory Book: pt requiring decreasing cues to reference memory book to locate picture and name of , their family and her address  Reviewed memory book and all family members, added \"Your \" under picture/name to dec confusion  Pt continues to not recognize her  and hallucinating people in house  Per  he called Janet from memory support office to discuss HHA/home therapy. Janet unsure what services he is interested in and wants to discuss with OTR  Purchasing cards from [employer] with letters, numbers and simple words to work on cognitive stimulation at home.            Engaged in activity small alphabet tiles on top grid with min cues to recognize letters appropriately.                  Neuro Re-Ed                 Therapeutic Exercise               Manual          HEP  HEP completion: encouraged engaging in cognitive tasks daily, such as puzzles or games, as well as walking 2-3x week for physical activity     Continue to log habit tracker at home to improve carryover of cognitively stimulating tasks as leisure activities within the home environment.      : Initiated memory book     : Engagement in leisure activity to increase cognitive stimulation:   -engagement in at least 1 activity daily and document in " activity book   -habit tracker for leisure engagement                 Assessment: Tolerated treatment well.  Continues to use memory book to locate family members names with cueing. Would benefit from cont OT to cont to address safety and increase consistency of engagement at home, to maintain level of cognitive function.     Plan: Continued skilled OT per POC                          INTERVENTION COMMENTS:  Diagnosis: Alzheimer dementia without behavioral disturbance (HCC) [G30.9, F02.80]  Precautions: impaired memory   Insurance: Payor: MEDICARE / Plan: MEDICARE A AND B / Product Type: Medicare A & B Fee for Service /   PN due on 10th visit, currently on 2/10  POC Expires: 6/17/25            Auth Tracker  DATE 3/25 3/31       Visit # 1/10 2/10

## 2025-04-04 ENCOUNTER — TELEPHONE (OUTPATIENT)
Facility: CLINIC | Age: 75
End: 2025-04-04

## 2025-04-04 NOTE — TELEPHONE ENCOUNTER
Call placed and VM left to pt's PCP Divine Post to request a script for home OT. Due to pt's recent cognitive decline, pt would better benefit from therapy in her home setting to assist with establishing routines and increase carryover with compensatory strategies for ADL and IADL completion.

## 2025-04-04 NOTE — TELEPHONE ENCOUNTER
VM left for Janet Nichols RN at [facility] ger[facility] regarding recommendations for home OT due to pt's declining cognitive function. Awaiting a call back.

## 2025-04-04 NOTE — TELEPHONE ENCOUNTER
Spoke with Janet Nichols RN at [facility] ger[facility]; order was placed for Home OT for Arely. Will cont with OPOT until home OT begins.

## 2025-04-10 ENCOUNTER — APPOINTMENT (OUTPATIENT)
Facility: CLINIC | Age: 75
End: 2025-04-10
Payer: MEDICARE

## 2025-04-11 ENCOUNTER — OFFICE VISIT (OUTPATIENT)
Facility: CLINIC | Age: 75
End: 2025-04-11
Payer: MEDICARE

## 2025-04-11 DIAGNOSIS — F02.80 ALZHEIMER DEMENTIA WITHOUT BEHAVIORAL DISTURBANCE (HCC): Primary | ICD-10-CM

## 2025-04-11 DIAGNOSIS — G30.9 ALZHEIMER DEMENTIA WITHOUT BEHAVIORAL DISTURBANCE (HCC): Primary | ICD-10-CM

## 2025-04-11 PROCEDURE — 97530 THERAPEUTIC ACTIVITIES: CPT

## 2025-04-11 NOTE — PROGRESS NOTES
"Occupational Therapy Daily Note:    Today's date: 2025  Patient name: Arely Wood  : 1950  MRN: [id number]  Referring provider: Divine Polk MD  Dx:   Encounter Diagnosis   Name Primary?    Alzheimer dementia without behavioral disturbance (HCC) Yes         Subjective: \"is this getting worse?\"      Objective: Pt engaged in skilled OT treatment session with focus on fxnl cognition, short term memory, long term memory, and fxnl attention to increase engagement, endurance, tolerance, and independence with daily ADL and IADL tasks.         PT Code Minutes                                           Task Details        Therapeutic Activity  Discussed the following:    Home therapy: discussed the transition between OP and home therapy; reviewed ST and OT with pt and pt's , the differences between the therapies and the benefits of both therapies. [facility] to be calling to set up ST and OT evaluations.   Cognitive status: noted decline with recognition of ; pt's  reporting pt with decreased recall of who is he, reports she stands in the garage looking for her  nightly; pt cont with hallucinations at night time, that people are in her home. Spent time discussing with pt the changes with her cognition since the IE. Pt aware that things are harder for her now than when she first started therapy.   Memory Book: pt able to recognize the people in her family but is not aware of the relationship to her; requires max cues to locate and recall their names   Pt and pt's  aware that OP therapy will stop once home therapy begins.                               Neuro Re-Ed                 Therapeutic Exercise               Manual          HEP  HEP completion: encouraged engaging in cognitive tasks daily, such as puzzles or games, as well as walking 2-3x week for physical activity     Continue to log habit tracker at home to improve carryover of cognitively stimulating tasks as leisure " activities within the home environment.      6/20: Initiated memory book     7/17: Engagement in leisure activity to increase cognitive stimulation:   -engagement in at least 1 activity daily and document in activity book   -habit tracker for leisure engagement                 Assessment: Tolerated treatment well.  Discussed transition to home therapy; pt and  with G understanding. Will cont with OPOT until home therapy starts. Would benefit from cont OT to cont to address safety and increase consistency of engagement at home, to maintain level of cognitive function.     Plan: Continued skilled OT per POC                          INTERVENTION COMMENTS:  Diagnosis: Alzheimer dementia without behavioral disturbance (HCC) [G30.9, F02.80]  Precautions: impaired memory   Insurance: Payor: MEDICARE / Plan: MEDICARE A AND B / Product Type: Medicare A & B Fee for Service /   PN due on 10th visit, currently on 3/10  POC Expires: 6/17/25            Auth Tracker  DATE 3/25 3/31 4/11      Visit # 1/10 2/10 3/10

## 2025-04-17 ENCOUNTER — OFFICE VISIT (OUTPATIENT)
Facility: CLINIC | Age: 75
End: 2025-04-17
Payer: MEDICARE

## 2025-04-17 DIAGNOSIS — G30.9 ALZHEIMER DEMENTIA WITHOUT BEHAVIORAL DISTURBANCE (HCC): Primary | ICD-10-CM

## 2025-04-17 DIAGNOSIS — F02.80 ALZHEIMER DEMENTIA WITHOUT BEHAVIORAL DISTURBANCE (HCC): Primary | ICD-10-CM

## 2025-04-17 PROCEDURE — 97530 THERAPEUTIC ACTIVITIES: CPT

## 2025-04-17 NOTE — PROGRESS NOTES
"Occupational Therapy Daily Note:    Today's date: 2025  Patient name: Arely Wood  : 1950  MRN: [id number]  Referring provider: Divine Polk MD  Dx:   Encounter Diagnosis   Name Primary?    Alzheimer dementia without behavioral disturbance (HCC) Yes         Subjective: \"this is all confusing\"       Objective: Pt engaged in skilled OT treatment session with focus on fxnl cognition, short term memory, long term memory, and fxnl attention to increase engagement, endurance, tolerance, and independence with daily ADL and IADL tasks.         PT Code Minutes                                           Task Details        Therapeutic Activity  Discussed the following:    Home therapy: pt's  reporting [facility] nurse was out in the beginning of the week to open her case. Home OT will be starting soon. Explained that today will be the last OPOT session,  with G understanding.   Cognitive status: pt's  reporting pt lost her purse on Monday and that it has been a stressful week, with pt having increased confusion. Pt's  also reporting that pt is having increased difficulty knowing and signing her name, and that she had difficulty signing her name on documents at the [facility].   Recommended pt's  gets POA paperwork, so he is able to sign for her when needed            Cognitive function:  -Pt able to state her full first and last name  -pt unable to sign her name upon request; pt able to copy her name with min/mod cueing and increased time  -pt unable to recall 's name but does recognize him                 Neuro Re-Ed                 Therapeutic Exercise               Manual          HEP  HEP completion: encouraged engaging in cognitive tasks daily, such as puzzles or games, as well as walking 2-3x week for physical activity     Continue to log habit tracker at home to improve carryover of cognitively stimulating tasks as leisure activities within the home environment.  "     6/20: Initiated memory book     7/17: Engagement in leisure activity to increase cognitive stimulation:   -engagement in at least 1 activity daily and document in activity book   -habit tracker for leisure engagement                 Assessment: pt continues with cognitive decline and increased confusion this week. HHS starting this week, therefore will dc OPOT. Pt and pt's  aware.         Plan: Discharge OT services                          INTERVENTION COMMENTS:  Diagnosis: Alzheimer dementia without behavioral disturbance (HCC) [G30.9, F02.80]  Precautions: impaired memory   Insurance: Payor: MEDICARE / Plan: MEDICARE A AND B / Product Type: Medicare A & B Fee for Service /   PN due on 10th visit, currently on 4/10  POC Expires: 6/17/25            Auth Tracker  DATE 3/25 3/31 4/11 4/17     Visit # 1/10 2/10 3/10 4/10